# Patient Record
Sex: FEMALE | Race: ASIAN | URBAN - METROPOLITAN AREA
[De-identification: names, ages, dates, MRNs, and addresses within clinical notes are randomized per-mention and may not be internally consistent; named-entity substitution may affect disease eponyms.]

---

## 2023-02-11 ENCOUNTER — INPATIENT (INPATIENT)
Facility: HOSPITAL | Age: 88
LOS: 3 days | Discharge: NURSING, REHAB OR LONG-TERM CARE | End: 2023-02-15
Attending: INTERNAL MEDICINE | Admitting: INTERNAL MEDICINE
Payer: MEDICAID

## 2023-02-11 VITALS
DIASTOLIC BLOOD PRESSURE: 63 MMHG | SYSTOLIC BLOOD PRESSURE: 104 MMHG | RESPIRATION RATE: 18 BRPM | OXYGEN SATURATION: 100 % | HEART RATE: 92 BPM | TEMPERATURE: 98 F

## 2023-02-11 DIAGNOSIS — S72.92XA UNSPECIFIED FRACTURE OF LEFT FEMUR, INITIAL ENCOUNTER FOR CLOSED FRACTURE: Chronic | ICD-10-CM

## 2023-02-11 DIAGNOSIS — S92.009A UNSPECIFIED FRACTURE OF UNSPECIFIED CALCANEUS, INITIAL ENCOUNTER FOR CLOSED FRACTURE: ICD-10-CM

## 2023-02-11 LAB
ALBUMIN SERPL ELPH-MCNC: 3.8 G/DL — SIGNIFICANT CHANGE UP (ref 3.3–5)
ALP SERPL-CCNC: 79 U/L — SIGNIFICANT CHANGE UP (ref 40–120)
ALT FLD-CCNC: 12 U/L — SIGNIFICANT CHANGE UP (ref 4–33)
ANION GAP SERPL CALC-SCNC: 10 MMOL/L — SIGNIFICANT CHANGE UP (ref 7–14)
APPEARANCE UR: CLEAR — SIGNIFICANT CHANGE UP
AST SERPL-CCNC: 18 U/L — SIGNIFICANT CHANGE UP (ref 4–32)
BACTERIA # UR AUTO: ABNORMAL
BILIRUB SERPL-MCNC: 0.7 MG/DL — SIGNIFICANT CHANGE UP (ref 0.2–1.2)
BILIRUB UR-MCNC: NEGATIVE — SIGNIFICANT CHANGE UP
BUN SERPL-MCNC: 18 MG/DL — SIGNIFICANT CHANGE UP (ref 7–23)
CALCIUM SERPL-MCNC: 9.1 MG/DL — SIGNIFICANT CHANGE UP (ref 8.4–10.5)
CHLORIDE SERPL-SCNC: 105 MMOL/L — SIGNIFICANT CHANGE UP (ref 98–107)
CO2 SERPL-SCNC: 22 MMOL/L — SIGNIFICANT CHANGE UP (ref 22–31)
COLOR SPEC: YELLOW — SIGNIFICANT CHANGE UP
CREAT SERPL-MCNC: 0.81 MG/DL — SIGNIFICANT CHANGE UP (ref 0.5–1.3)
DIFF PNL FLD: NEGATIVE — SIGNIFICANT CHANGE UP
EGFR: 69 ML/MIN/1.73M2 — SIGNIFICANT CHANGE UP
EPI CELLS # UR: 11 /HPF — HIGH (ref 0–5)
GLUCOSE SERPL-MCNC: 116 MG/DL — HIGH (ref 70–99)
GLUCOSE UR QL: NEGATIVE — SIGNIFICANT CHANGE UP
HCT VFR BLD CALC: 37.7 % — SIGNIFICANT CHANGE UP (ref 34.5–45)
HGB BLD-MCNC: 11.4 G/DL — LOW (ref 11.5–15.5)
HYALINE CASTS # UR AUTO: 1 /LPF — SIGNIFICANT CHANGE UP (ref 0–7)
KETONES UR-MCNC: NEGATIVE — SIGNIFICANT CHANGE UP
LEUKOCYTE ESTERASE UR-ACNC: NEGATIVE — SIGNIFICANT CHANGE UP
MCHC RBC-ENTMCNC: 24.1 PG — LOW (ref 27–34)
MCHC RBC-ENTMCNC: 30.2 GM/DL — LOW (ref 32–36)
MCV RBC AUTO: 79.7 FL — LOW (ref 80–100)
NITRITE UR-MCNC: NEGATIVE — SIGNIFICANT CHANGE UP
NRBC # BLD: 0 /100 WBCS — SIGNIFICANT CHANGE UP (ref 0–0)
NRBC # FLD: 0 K/UL — SIGNIFICANT CHANGE UP (ref 0–0)
PH UR: 6 — SIGNIFICANT CHANGE UP (ref 5–8)
PLATELET # BLD AUTO: 190 K/UL — SIGNIFICANT CHANGE UP (ref 150–400)
POTASSIUM SERPL-MCNC: 4.3 MMOL/L — SIGNIFICANT CHANGE UP (ref 3.5–5.3)
POTASSIUM SERPL-SCNC: 4.3 MMOL/L — SIGNIFICANT CHANGE UP (ref 3.5–5.3)
PROT SERPL-MCNC: 6.8 G/DL — SIGNIFICANT CHANGE UP (ref 6–8.3)
PROT UR-MCNC: ABNORMAL
RBC # BLD: 4.73 M/UL — SIGNIFICANT CHANGE UP (ref 3.8–5.2)
RBC # FLD: 15.2 % — HIGH (ref 10.3–14.5)
RBC CASTS # UR COMP ASSIST: 2 /HPF — SIGNIFICANT CHANGE UP (ref 0–4)
SODIUM SERPL-SCNC: 137 MMOL/L — SIGNIFICANT CHANGE UP (ref 135–145)
SP GR SPEC: 1.02 — SIGNIFICANT CHANGE UP (ref 1.01–1.05)
UROBILINOGEN FLD QL: SIGNIFICANT CHANGE UP
WBC # BLD: 12.51 K/UL — HIGH (ref 3.8–10.5)
WBC # FLD AUTO: 12.51 K/UL — HIGH (ref 3.8–10.5)
WBC UR QL: 5 /HPF — SIGNIFICANT CHANGE UP (ref 0–5)

## 2023-02-11 PROCEDURE — 73620 X-RAY EXAM OF FOOT: CPT | Mod: 26,LT

## 2023-02-11 PROCEDURE — 73700 CT LOWER EXTREMITY W/O DYE: CPT | Mod: 26,LT,MA

## 2023-02-11 PROCEDURE — 99285 EMERGENCY DEPT VISIT HI MDM: CPT

## 2023-02-11 PROCEDURE — 74176 CT ABD & PELVIS W/O CONTRAST: CPT | Mod: 26,MG

## 2023-02-11 PROCEDURE — G1004: CPT

## 2023-02-11 RX ORDER — ACETAMINOPHEN 500 MG
650 TABLET ORAL ONCE
Refills: 0 | Status: COMPLETED | OUTPATIENT
Start: 2023-02-11 | End: 2023-02-11

## 2023-02-11 RX ADMIN — Medication 650 MILLIGRAM(S): at 17:07

## 2023-02-11 RX ADMIN — Medication 650 MILLIGRAM(S): at 18:07

## 2023-02-11 NOTE — ED ADULT NURSE NOTE - OBJECTIVE STATEMENT
Patient is an 88 yo female, denies PMH, presenting with L hip/ankle/foot pain after mechanical fall yesterday. AAOx2, at baseline per family member at bedside, no signs of distress, reports she was walking and fell down one step. Denies headstrike, LOC or anticoagulation. Patient was able to walk afterwards but coming in today with increasing pain. No ecchymosis or deformity on exam, L ankle swelling noted. Denies recent falls, injuries, illnesses. 20G PIV placed to RAC, labs sent per orders. Medicated for pain per orders. Pending x-rays. Fall precautions maintained.

## 2023-02-11 NOTE — CONSULT NOTE ADULT - ASSESSMENT
89F presents with a left calcaneal fracture.  - Pt seen and evaluated.  - Afebrile, WBC 12.51, vitals stable.  - Left Foot X-Ray: Nondisplaced posterior superior calcaneal fracture:  - Physical Exam:  - LLE CT: Pending.  - Recommend admit to medicine under Dr. Suazo 2/2 inability to ambulate with crutches and needs rehab placement.  - Applied posterior splint to be kept clean, dry, and intact.  - Recommend remaining non-weightbearing to the LLE.  - Pod Plan: Rehab placement with possible left ankle ORIF pending LLE CT.  - Please document medical clearance for podiatric surgery under anesthesia.  - Discussed with attending. 89F presents with a left calcaneal fracture.  - Pt seen and evaluated.  - Afebrile, WBC 12.51, vitals stable.  - Left Foot X-Ray: Nondisplaced posterior superior calcaneal fracture.  - Left foot mild ecchymosis on the area overlying the posterior calcaneus, no open wounds, no acute signs of infection. Right foot no open wounds, no acute signs of infection.  - LLE CT: Pending.  - Recommend admit to medicine under Dr. Suazo 2/2 inability to ambulate with crutches and needs rehab placement.  - Applied posterior splint to be kept clean, dry, and intact.  - Recommend remaining non-weightbearing to the LLE.  - Pod Plan: Rehab placement with possible left ankle ORIF pending LLE CT.  - Please document medical clearance for podiatric surgery under anesthesia.  - Discussed with attending. 89F presents with a left calcaneal fracture.  - Pt seen and evaluated.  - Afebrile, WBC 12.51, vitals stable.  - Left Foot X-Ray: Nondisplaced posterior superior calcaneal fracture.  - Left foot mild ecchymosis on the area overlying the posterior calcaneus, no open wounds, no acute signs of infection. Right foot no open wounds, no acute signs of infection.  - Neurovascular status intact with little to no concern for compartment syndrome.  - LLE CT: Pending.  - Recommend admit to medicine under Dr. Suazo 2/2 inability to ambulate with crutches and needs rehab placement.  - Applied posterior splint to be kept clean, dry, and intact.  - Recommend remaining non-weightbearing to the LLE.  - Pod Plan: Rehab placement with possible left ankle ORIF pending LLE CT.  - Please document medical clearance for podiatric surgery under anesthesia.  - Discussed with attending. 89F presents with a left calcaneal fracture.  - Pt seen and evaluated with grandson present for the entirety of the consult and translated.  - Afebrile, WBC 12.51, vitals stable.  - Left Foot X-Ray: Nondisplaced posterior superior calcaneal fracture.  - Left foot mild ecchymosis on the area overlying the posterior calcaneus, no open wounds, no acute signs of infection. Right foot no open wounds, no acute signs of infection.  - Neurovascular status intact with little to no concern for compartment syndrome.  - LLE CT: Pending.  - Recommend admit to medicine under Dr. Suazo 2/2 inability to ambulate with crutches and needs rehab placement.  - Applied posterior splint to be kept clean, dry, and intact.  - Recommend remaining non-weightbearing to the LLE.  - Pod Plan: Rehab placement with possible left ankle ORIF pending LLE CT.  - Please document medical clearance for podiatric surgery under anesthesia.  - Discussed with attending.

## 2023-02-11 NOTE — ED ADULT TRIAGE NOTE - CHIEF COMPLAINT QUOTE
lea states pt slipped while getting up from chair yesterday. c/o pain l ft, back- normally ambulates with canr. denies head injury,loc- on no meds grandson states pt slipped while getting up from chair yesterday. c/o pain l ft, back- normally ambulates with canr. denies head injury,loc- on no meds. denies dizziness prior to fall.  fs 132

## 2023-02-11 NOTE — ED PROVIDER NOTE - OBJECTIVE STATEMENT
Attending/Mateus: 89 yo F w/ (ambulates with cane, performs most ADLs) no sig PMHx brought in by her grandson s/p fall witnessed fall yesterday. While sitting on the last two stairs at the  bottoms of a staircase , pt stood up, s/lip/fall onto left side. No reports of head trauma, LOC. Pt vc/o left flank/hip/heel pain. Pt unabel tow eight-bear due to [pain. Deneis HODGE, nausea, CP, SOB, palp, weakness or lightheadedness.

## 2023-02-11 NOTE — ED PROVIDER NOTE - PHYSICAL EXAMINATION
Attending/Mateus: NAD; Head-atraumatic, PERRL/EOMI, no cpsine PT, supple, no JVD, RRR, CTAB; Abd-soft, NT/ND, no HSM; +left lower rib PT, no crepitus, nop STS, no ecchymosis; +left hip-+ROM, +pain, no ecchymosis, Lt heel-calcaneus +ecchymosis, +STS, no crepitus, A&Ox3, nonfocal; Skin-warm/dry

## 2023-02-11 NOTE — ED ADULT NURSE NOTE - CHIEF COMPLAINT QUOTE
grandson states pt slipped while getting up from chair yesterday. c/o pain l ft, back- normally ambulates with canr. denies head injury,loc- on no meds. denies dizziness prior to fall.  fs 132

## 2023-02-11 NOTE — ED PROVIDER NOTE - CLINICAL SUMMARY MEDICAL DECISION MAKING FREE TEXT BOX
A/P 88 yo F no sig PMHx, not on AC, s/p witnessed mechanical fall now c/o left flank/hip/calcaneal pain; exam-noted for left lower rib PT/hip pin/calcaneal pain r/o fracture  Plan CT/xray oif pelvis, hip/foot; labs, analgesia  No EMR to review

## 2023-02-11 NOTE — CONSULT NOTE ADULT - SUBJECTIVE AND OBJECTIVE BOX
Patient is a 89y old  Female who presents with a chief complaint of left foot pain.    HPI: 88 yo F w/ (ambulates with cane, performs most ADLs) no sig PMHx brought in by her grandson s/p fall witnessed fall yesterday. While sitting on the last two stairs at the  bottoms of a staircase , pt stood up, s/lip/fall onto left side. No reports of head trauma, LOC. Pt vc/o left flank/hip/heel pain. Pt unabel tow eight-bear due to [pain. Deneis HODGE, nausea, CP, SOB, palp, weakness or lightheadedness.      PAST MEDICAL & SURGICAL HISTORY:  No pertinent past medical history      Fracture of left femur          MEDICATIONS  (STANDING):    MEDICATIONS  (PRN):      Allergies    No Known Allergies    Intolerances        VITALS:    Vital Signs Last 24 Hrs  T(C): 36.9 (11 Feb 2023 20:53), Max: 36.9 (11 Feb 2023 14:40)  T(F): 98.5 (11 Feb 2023 20:53), Max: 98.5 (11 Feb 2023 14:40)  HR: 73 (11 Feb 2023 20:53) (73 - 92)  BP: 127/73 (11 Feb 2023 20:53) (104/63 - 127/73)  BP(mean): --  RR: 17 (11 Feb 2023 20:53) (17 - 18)  SpO2: 99% (11 Feb 2023 20:53) (99% - 100%)    Parameters below as of 11 Feb 2023 20:53  Patient On (Oxygen Delivery Method): room air        LABS:                          11.4   12.51 )-----------( 190      ( 11 Feb 2023 17:11 )             37.7       02-11    137  |  105  |  18  ----------------------------<  116<H>  4.3   |  22  |  0.81    Ca    9.1      11 Feb 2023 17:11    TPro  6.8  /  Alb  3.8  /  TBili  0.7  /  DBili  x   /  AST  18  /  ALT  12  /  AlkPhos  79  02-11      CAPILLARY BLOOD GLUCOSE      POCT Blood Glucose.: 132 mg/dL (11 Feb 2023 14:51)          LOWER EXTREMITY PHYSICAL EXAM:    Vascular: DP/PT _/4, B/L, CFT <_ seconds B/L, Temperature gradient _, B/L.   Neuro: Epicritic sensation _ to the level of _, B/L.  Musculoskeletal/Ortho:  Skin:      RADIOLOGY & ADDITIONAL STUDIES:     Patient is a 89y old  Female who presents with a chief complaint of left foot pain.    HPI: 89 yo F w/ (ambulates with cane, performs most ADLs) no sig PMHx brought in by her grandson s/p fall witnessed fall yesterday. While sitting on the last two stairs at the  bottoms of a staircase , pt stood up, s/lip/fall onto left side. No reports of head trauma, LOC. Pt vc/o left flank/hip/heel pain. Pt unabel tow eight-bear due to [pain. Deneis HODGE, nausea, CP, SOB, palp, weakness or lightheadedness.      PAST MEDICAL & SURGICAL HISTORY:  No pertinent past medical history      Fracture of left femur          MEDICATIONS  (STANDING):    MEDICATIONS  (PRN):      Allergies    No Known Allergies    Intolerances        VITALS:    Vital Signs Last 24 Hrs  T(C): 36.9 (11 Feb 2023 20:53), Max: 36.9 (11 Feb 2023 14:40)  T(F): 98.5 (11 Feb 2023 20:53), Max: 98.5 (11 Feb 2023 14:40)  HR: 73 (11 Feb 2023 20:53) (73 - 92)  BP: 127/73 (11 Feb 2023 20:53) (104/63 - 127/73)  BP(mean): --  RR: 17 (11 Feb 2023 20:53) (17 - 18)  SpO2: 99% (11 Feb 2023 20:53) (99% - 100%)    Parameters below as of 11 Feb 2023 20:53  Patient On (Oxygen Delivery Method): room air        LABS:                          11.4   12.51 )-----------( 190      ( 11 Feb 2023 17:11 )             37.7       02-11    137  |  105  |  18  ----------------------------<  116<H>  4.3   |  22  |  0.81    Ca    9.1      11 Feb 2023 17:11    TPro  6.8  /  Alb  3.8  /  TBili  0.7  /  DBili  x   /  AST  18  /  ALT  12  /  AlkPhos  79  02-11      CAPILLARY BLOOD GLUCOSE      POCT Blood Glucose.: 132 mg/dL (11 Feb 2023 14:51)          LOWER EXTREMITY PHYSICAL EXAM:    Vascular: DP/PT 2/4, B/L, CFT <3 seconds B/L, Temperature gradient warm to cool, B/L.   Neuro: Epicritic sensation intact to the level of toes, B/L.  Musculoskeletal/Ortho: Left foot pain on palpation of the insertion of the Achilles tendon with mild guarding.  Skin: Left foot mild ecchymosis on the area overlying the posterior calcaneus, no open wounds, no acute signs of infection. Right foot no open wounds, no acute signs of infection.      RADIOLOGY & ADDITIONAL STUDIES:

## 2023-02-11 NOTE — ED ADULT NURSE REASSESSMENT NOTE - NS ED NURSE REASSESS COMMENT FT1
Pt started by float RN. Pt A&Ox4, family at bedside translating, ambulatory with cane at baseline, coming to the ED s/p fall yesterday. Pt states to have pain in the right hip. Pt noted to have swollen R ankle. No other injuries noted at this time. Pt RR equal and unlabored. Pt reassessed for pain and states that pain is relieved after medication. Awaiting lab results and imaging. Safety maintained. Care plan continued as per ordered.

## 2023-02-12 ENCOUNTER — TRANSCRIPTION ENCOUNTER (OUTPATIENT)
Age: 88
End: 2023-02-12

## 2023-02-12 DIAGNOSIS — N94.9 UNSPECIFIED CONDITION ASSOCIATED WITH FEMALE GENITAL ORGANS AND MENSTRUAL CYCLE: ICD-10-CM

## 2023-02-12 DIAGNOSIS — D72.829 ELEVATED WHITE BLOOD CELL COUNT, UNSPECIFIED: ICD-10-CM

## 2023-02-12 DIAGNOSIS — S32.000A WEDGE COMPRESSION FRACTURE OF UNSPECIFIED LUMBAR VERTEBRA, INITIAL ENCOUNTER FOR CLOSED FRACTURE: ICD-10-CM

## 2023-02-12 DIAGNOSIS — R63.8 OTHER SYMPTOMS AND SIGNS CONCERNING FOOD AND FLUID INTAKE: ICD-10-CM

## 2023-02-12 DIAGNOSIS — S92.009A UNSPECIFIED FRACTURE OF UNSPECIFIED CALCANEUS, INITIAL ENCOUNTER FOR CLOSED FRACTURE: ICD-10-CM

## 2023-02-12 LAB
24R-OH-CALCIDIOL SERPL-MCNC: 11.8 NG/ML — LOW (ref 30–80)
ALBUMIN SERPL ELPH-MCNC: 3.5 G/DL — SIGNIFICANT CHANGE UP (ref 3.3–5)
ALP SERPL-CCNC: 71 U/L — SIGNIFICANT CHANGE UP (ref 40–120)
ALT FLD-CCNC: 10 U/L — SIGNIFICANT CHANGE UP (ref 4–33)
ANION GAP SERPL CALC-SCNC: 9 MMOL/L — SIGNIFICANT CHANGE UP (ref 7–14)
APTT BLD: 25.5 SEC — LOW (ref 27–36.3)
AST SERPL-CCNC: 17 U/L — SIGNIFICANT CHANGE UP (ref 4–32)
BASOPHILS # BLD AUTO: 0.02 K/UL — SIGNIFICANT CHANGE UP (ref 0–0.2)
BASOPHILS NFR BLD AUTO: 0.2 % — SIGNIFICANT CHANGE UP (ref 0–2)
BILIRUB SERPL-MCNC: 0.5 MG/DL — SIGNIFICANT CHANGE UP (ref 0.2–1.2)
BLD GP AB SCN SERPL QL: NEGATIVE — SIGNIFICANT CHANGE UP
BUN SERPL-MCNC: 18 MG/DL — SIGNIFICANT CHANGE UP (ref 7–23)
CALCIUM SERPL-MCNC: 8.6 MG/DL — SIGNIFICANT CHANGE UP (ref 8.4–10.5)
CHLORIDE SERPL-SCNC: 108 MMOL/L — HIGH (ref 98–107)
CO2 SERPL-SCNC: 23 MMOL/L — SIGNIFICANT CHANGE UP (ref 22–31)
CREAT SERPL-MCNC: 0.79 MG/DL — SIGNIFICANT CHANGE UP (ref 0.5–1.3)
EGFR: 71 ML/MIN/1.73M2 — SIGNIFICANT CHANGE UP
EOSINOPHIL # BLD AUTO: 0.14 K/UL — SIGNIFICANT CHANGE UP (ref 0–0.5)
EOSINOPHIL NFR BLD AUTO: 1.6 % — SIGNIFICANT CHANGE UP (ref 0–6)
GLUCOSE SERPL-MCNC: 143 MG/DL — HIGH (ref 70–99)
HCT VFR BLD CALC: 33 % — LOW (ref 34.5–45)
HGB BLD-MCNC: 10.4 G/DL — LOW (ref 11.5–15.5)
IANC: 5.92 K/UL — SIGNIFICANT CHANGE UP (ref 1.8–7.4)
IMM GRANULOCYTES NFR BLD AUTO: 0.4 % — SIGNIFICANT CHANGE UP (ref 0–0.9)
INR BLD: 1.22 RATIO — HIGH (ref 0.88–1.16)
LYMPHOCYTES # BLD AUTO: 2.05 K/UL — SIGNIFICANT CHANGE UP (ref 1–3.3)
LYMPHOCYTES # BLD AUTO: 24.1 % — SIGNIFICANT CHANGE UP (ref 13–44)
MAGNESIUM SERPL-MCNC: 2.2 MG/DL — SIGNIFICANT CHANGE UP (ref 1.6–2.6)
MCHC RBC-ENTMCNC: 25.1 PG — LOW (ref 27–34)
MCHC RBC-ENTMCNC: 31.5 GM/DL — LOW (ref 32–36)
MCV RBC AUTO: 79.5 FL — LOW (ref 80–100)
MONOCYTES # BLD AUTO: 0.36 K/UL — SIGNIFICANT CHANGE UP (ref 0–0.9)
MONOCYTES NFR BLD AUTO: 4.2 % — SIGNIFICANT CHANGE UP (ref 2–14)
NEUTROPHILS # BLD AUTO: 5.92 K/UL — SIGNIFICANT CHANGE UP (ref 1.8–7.4)
NEUTROPHILS NFR BLD AUTO: 69.5 % — SIGNIFICANT CHANGE UP (ref 43–77)
NRBC # BLD: 0 /100 WBCS — SIGNIFICANT CHANGE UP (ref 0–0)
NRBC # FLD: 0 K/UL — SIGNIFICANT CHANGE UP (ref 0–0)
PHOSPHATE SERPL-MCNC: 3.4 MG/DL — SIGNIFICANT CHANGE UP (ref 2.5–4.5)
PLATELET # BLD AUTO: 165 K/UL — SIGNIFICANT CHANGE UP (ref 150–400)
POTASSIUM SERPL-MCNC: 4.1 MMOL/L — SIGNIFICANT CHANGE UP (ref 3.5–5.3)
POTASSIUM SERPL-SCNC: 4.1 MMOL/L — SIGNIFICANT CHANGE UP (ref 3.5–5.3)
PROT SERPL-MCNC: 6.2 G/DL — SIGNIFICANT CHANGE UP (ref 6–8.3)
PROTHROM AB SERPL-ACNC: 14.2 SEC — HIGH (ref 10.5–13.4)
RBC # BLD: 4.15 M/UL — SIGNIFICANT CHANGE UP (ref 3.8–5.2)
RBC # FLD: 15.3 % — HIGH (ref 10.3–14.5)
RH IG SCN BLD-IMP: POSITIVE — SIGNIFICANT CHANGE UP
SARS-COV-2 RNA SPEC QL NAA+PROBE: SIGNIFICANT CHANGE UP
SODIUM SERPL-SCNC: 140 MMOL/L — SIGNIFICANT CHANGE UP (ref 135–145)
T4 AB SER-ACNC: 5.66 UG/DL — SIGNIFICANT CHANGE UP (ref 5.1–13)
TSH SERPL-MCNC: 5.43 UIU/ML — HIGH (ref 0.27–4.2)
WBC # BLD: 8.52 K/UL — SIGNIFICANT CHANGE UP (ref 3.8–10.5)
WBC # FLD AUTO: 8.52 K/UL — SIGNIFICANT CHANGE UP (ref 3.8–10.5)

## 2023-02-12 PROCEDURE — 72125 CT NECK SPINE W/O DYE: CPT | Mod: 26

## 2023-02-12 PROCEDURE — 99223 1ST HOSP IP/OBS HIGH 75: CPT

## 2023-02-12 PROCEDURE — 70450 CT HEAD/BRAIN W/O DYE: CPT | Mod: 26

## 2023-02-12 RX ORDER — SODIUM CHLORIDE 9 MG/ML
1000 INJECTION INTRAMUSCULAR; INTRAVENOUS; SUBCUTANEOUS
Refills: 0 | Status: DISCONTINUED | OUTPATIENT
Start: 2023-02-12 | End: 2023-02-13

## 2023-02-12 RX ORDER — ACETAMINOPHEN 500 MG
650 TABLET ORAL EVERY 6 HOURS
Refills: 0 | Status: DISCONTINUED | OUTPATIENT
Start: 2023-02-12 | End: 2023-02-15

## 2023-02-12 RX ORDER — OXYCODONE HYDROCHLORIDE 5 MG/1
5 TABLET ORAL EVERY 4 HOURS
Refills: 0 | Status: DISCONTINUED | OUTPATIENT
Start: 2023-02-12 | End: 2023-02-15

## 2023-02-12 RX ADMIN — Medication 650 MILLIGRAM(S): at 01:41

## 2023-02-12 RX ADMIN — OXYCODONE HYDROCHLORIDE 5 MILLIGRAM(S): 5 TABLET ORAL at 01:12

## 2023-02-12 RX ADMIN — SODIUM CHLORIDE 80 MILLILITER(S): 9 INJECTION INTRAMUSCULAR; INTRAVENOUS; SUBCUTANEOUS at 07:23

## 2023-02-12 RX ADMIN — OXYCODONE HYDROCHLORIDE 5 MILLIGRAM(S): 5 TABLET ORAL at 01:42

## 2023-02-12 RX ADMIN — Medication 650 MILLIGRAM(S): at 16:37

## 2023-02-12 RX ADMIN — SODIUM CHLORIDE 80 MILLILITER(S): 9 INJECTION INTRAMUSCULAR; INTRAVENOUS; SUBCUTANEOUS at 21:10

## 2023-02-12 RX ADMIN — Medication 650 MILLIGRAM(S): at 01:11

## 2023-02-12 NOTE — ED ADULT NURSE REASSESSMENT NOTE - NS ED NURSE REASSESS COMMENT FT1
report received from night shift RN. pt aaox4, breathing even and unlabored. Pt denies SOB, chest pain, dizziness and headache. Bed in lowest position, family at bedside translating.

## 2023-02-12 NOTE — DISCHARGE NOTE PROVIDER - NSDCCPCAREPLAN_GEN_ALL_CORE_FT
PRINCIPAL DISCHARGE DIAGNOSIS  Diagnosis: Calcaneal fracture  Assessment and Plan of Treatment: You were found to have a fracture of your heel. You were seen by the podiatry team. You are to continue non-weightbearing to the lower foot. Keep left lower extremity under 2 pillows. Continue with tylenol for pain.      SECONDARY DISCHARGE DIAGNOSES  Diagnosis: Adnexal cyst  Assessment and Plan of Treatment: You were found to have a cyst in your pelvis. You are to follow up with gynecology for repeat ultrasound as outpatient.    Diagnosis: Compression fracture of lumbar vertebra  Assessment and Plan of Treatment: You have a compression fracture of your vertebrae. You are to follow up with PCP as outpatient for further management.

## 2023-02-12 NOTE — ED ADULT NURSE REASSESSMENT NOTE - NS ED NURSE REASSESS COMMENT FT1
Pt denies complaints at this time. No acute distress noted. Respirations even and unlabored. No acute distress noted at this time. Elevated L legs with pillows, mouth care provided. Safety maintained. Continue with plan of care. Pt denies complaints at this time. Respirations even and unlabored. No acute distress noted at this time. Elevated L legs with pillows, mouth care provided. Safety maintained. Continue with plan of care.

## 2023-02-12 NOTE — H&P ADULT - PROBLEM SELECTOR PLAN 4
F-80 cc/hr  E-replete prn  N-NPO except meds  AC per podiatry -likely reactive in setting of mechanical fall, no dysuria, cough, or fevers  -no skin rash or other causes of leukocytosis

## 2023-02-12 NOTE — DISCHARGE NOTE PROVIDER - NSFOLLOWUPCLINICS_GEN_ALL_ED_FT
[facility]  Internal Medicine  [address] [place] [address]  Phone: [phone]  Fax: [phone]

## 2023-02-12 NOTE — DISCHARGE NOTE PROVIDER - REASON FOR ADMISSION
HOSPITALIST ADMISSION HISTORY AND PHYSICAL  Patient: Brian Behrens Date: 2020   : 1959    61 year old male 2020       PCP:   Dr. Akil Mcclendon ([facility])    This physician, recommends Brian Behrens be admitted as an INPATIENT to the medical unit/ICU. The expected LOS exceeds 2 midnights. Reason(s) for INPATIENT CARE include alcohol intoxication with impending withdrawal and based on severity of presenting sx, co-morbidities, and lab/imaging, this patient has an increased risk of adverse outcomes.      Based on severity of presenting sx, co-morbidities, and my review of lab/imaging results, I would expect Brian Behrens’s condition to deteriorate with possible end organ damage and/or life threatening complications if outpatient care persists.    CC:  Alcohol intoxication    HPI:  Brian Behrens is a 61 year old male with history of alcohol abuse, hypertension, and hyperlipidemia presenting with the desire to quit drinking.  He quit working at the began the COVID endemic () and since that time has been drinking on a daily basis from 11AM until he goes to sleep at night.  Both his best friend and dog have  within the last week.  He has not had seizures or hallucinations in the past, only tremors.  His food intake has been poor but he denies specific complaints.    PAST MEDICAL HISTORY  Past Medical History:   Diagnosis Date   • Elevated LFTs    • GERD (gastroesophageal reflux disease)    • HTN (hypertension)    • Hyperlipidemia    • Prediabetes 2019       PAST SURGICAL HISTORY  History reviewed. No pertinent surgical history.    SOCIAL HISTORY  Patient lives with his girlfriend.  Does not smoke cigarettes or use illicit drugs.  He is unable to quantify exactly how much he drinks but it is substantial.    FAMILY HISTORY  No family history on file.    MEDICATIONS  Current Outpatient Medications   Medication Sig Dispense Refill   • omeprazole (PRILOSEC) 40 MG capsule Take 1  capsule by mouth daily. 90 capsule 1   • lisinopril-hydroCHLOROthiazide (PRINZIDE,ZESTORETIC) 20-12.5 MG per tablet Take 2 tablets by mouth daily. 180 tablet 3   • atorvastatin (LIPITOR) 20 MG tablet Take 1 tablet by mouth daily. Please schedule appointment for future refills. 90 tablet 3   • methylPREDNISolone (MEDROL, AVRIL,) 4 MG tablet Take 6 tablets by mouth daily. Wean down until gone     • umeclidinium-vilanterol (ANORO ELLIPTA) 62.5-25 MCG/INH inhaler Inhale 1 puff into the lungs daily. 60 each 11   • B Complex-C (SUPER B COMPLEX PO) Take 1 tablet by mouth daily.     • aspirin 81 MG tablet Take 81 mg by mouth daily.     • Cholecalciferol (VITAMIN D-3) 1000 UNITS CAPS Take  by mouth.     • Multiple Vitamins-Minerals (MULTIVITAL PLATINUM SILVER) CHEW Chew 1 tablet by mouth daily. 90 tablet 3   • vitamin E 400 UNIT capsule Take 1 capsule by mouth daily. 90 capsule 3     ALLERGIES  ALLERGIES:  No Known Allergies    REVIEW OF SYMPTOMS   General: Denies fever, chills, weight loss  Skin: Denies new rashes, lesions  Eyes: Denies acute visual changes or discharge   HENT:: Denies cold, sore throat, epistaxis  Respiratory: Denies SOB, orthopnea, cough  Cardiovascular: Denies chest pain, palpitations, acute/worsening lower extremity edema  Gastrointestinal: Denies abdominal pain, nausea, vomiting, constipation, diarrhea  Genitourinary: Denies polyuria, dysuria, hematuria  Neurologic: Denies headache, speech disturbance, focal numbness or weakness  Musculoskeletal: Denies acute joint pains or inflammation    PHYSICAL EXAM:  VITAL SIGNS:   Visit Vitals  BP (!) 155/99   Pulse 76   Temp 98.5 °F (36.9 °C) (Temporal)   Resp (!) 21   Ht 6' 2\" (1.88 m)   Wt 104.3 kg   SpO2 94%   BMI 29.53 kg/m²   ;  Body mass index is 29.53 kg/m².  GENERAL APPEARANCE: No apparent distress. Alert and oriented x 3.  SKIN: No skin rashes, no ulcers.  HEENT: Normocephalic/atraumatic, PERRL, EOMI.  Anicteric sclera. Oropharynx moist without thrush,  ulcers or erythema.  NECK: Supple and non-tender. No jugular venous distension. No thyromegaly. No anterior, submandibular or supraclavicular adenopathy. No carotid bruits.   LUNGS: Unlabored breathing. Clear to auscultation. No rhonchi, wheezing, or crackles.  HEART: Normal PMI, normal rate and rhythm, no murmurs.  ABDOMEN: Soft, normoactive bowel sounds, non-tender without guarding or rebound. No hepatomegaly or splenomegaly.  EXTREMITIES: No clubbing or cyanosis. No edema. No calf asymmetry or tenderness.  Vascular exam reveals 2+ radial and dorsal pedis bilateral pulses.  MUSCULOSKELETAL and NEUROLOGIC: Grossly normal ROM of all 4 extremities.  Muscle tone and motor strength normal/appropriate for age. Cranial nerves 2 through 12 grossly normal.     LABS:  Recent Results (from the past 24 hour(s))   Comprehensive Metabolic Panel    Collection Time: 05/18/20  9:30 PM   Result Value Ref Range    Fasting Status      Sodium 142 135 - 145 mmol/L    Potassium 2.4 (LL) 3.4 - 5.1 mmol/L    Chloride 98 98 - 107 mmol/L    Carbon Dioxide 37 (H) 21 - 32 mmol/L    Anion Gap 9 (L) 10 - 20 mmol/L    Glucose 97 65 - 99 mg/dL    BUN 2 (L) 6 - 20 mg/dL    Creatinine 0.78 0.67 - 1.17 mg/dL    Glomerular Filtration Rate >90 >90    BUN/ Creatinine Ratio 3 (L) 7 - 25    Bilirubin, Total 1.1 (H) 0.2 - 1.0 mg/dL    GOT/ (H) <=37 Units/L    Alkaline Phosphatase 120 (H) 45 - 117 Units/L    Albumin 3.7 3.6 - 5.1 g/dL    Protein, Total 7.9 6.4 - 8.2 g/dL    Globulin 4.2 (H) 2.0 - 4.0 g/dL    A/G Ratio 0.9 (L) 1.0 - 2.4    GPT/ (H) <64 Units/L    Calcium 8.2 (L) 8.4 - 10.2 mg/dL   Salicylate Level    Collection Time: 05/18/20  9:30 PM   Result Value Ref Range    Salicylate <2.8 <=30.0 mg/dL   Acetaminophen Level    Collection Time: 05/18/20  9:30 PM   Result Value Ref Range    Acetaminophen <2 (L) 10 - 30 mcg/mL   Troponin I Ultra Sensitive    Collection Time: 05/18/20  9:30 PM   Result Value Ref Range    Troponin I, Ultra  Sensitive 0.02 <=0.04 ng/mL   CBC with Automated Differential (performable only)    Collection Time: 05/18/20  9:30 PM   Result Value Ref Range    WBC 3.5 (L) 4.2 - 11.0 K/mcL    RBC 4.61 4.50 - 5.90 mil/mcL    HGB 14.8 13.0 - 17.0 g/dL    HCT 41.1 39.0 - 51.0 %    MCV 89.2 78.0 - 100.0 fl    MCH 32.1 26.0 - 34.0 pg    MCHC 36.0 32.0 - 36.5 g/dL    RDW-CV 12.9 11.0 - 15.0 %     (L) 140 - 450 K/mcL    NRBC 0 <=0 /100 WBC    Neutrophil, Percent 27 %    Lymphocytes, Percent 53 %    Mono, Percent 14 %    Eosinophils, Percent 3 %    Basophils, Percent 3 %    Immature Granulocytes 0 %    Absolute Neutrophils 1.0 (L) 1.8 - 7.7 K/mcL    Absolute Lymphocytes 1.9 1.0 - 4.0 K/mcL    Absolute Monocytes 0.5 0.3 - 0.9 K/mcL    Absolute Eosinophils  0.1 0.1 - 0.5 K/mcL    Absolute Basophils 0.1 0.0 - 0.3 K/mcL    Absolute Immmature Granulocytes 0.0 0.0 - 0.2 K/mcL    RDW-SD 42.3 39.0 - 50.0 fL   Magnesium    Collection Time: 05/18/20  9:30 PM   Result Value Ref Range    Magnesium 1.6 (L) 1.7 - 2.4 mg/dL   Calcium, Ionized    Collection Time: 05/18/20  9:30 PM   Result Value Ref Range    Ionized Calcium 0.94 (L) 1.15 - 1.29 mmol/L    Ionized Calcium, Corrected 1.01 (L) 1.15 - 1.29 mmol/L   Alcohol    Collection Time: 05/18/20  9:30 PM   Result Value Ref Range    Alcohol 407 (H) <3 mg/dL   Rapid SARS-CoV-2 by PCR    Collection Time: 05/18/20 10:03 PM   Result Value Ref Range    Rapid SARS-COV-2 by PCR Not Detected Not Detected    Isolation Guidelines     URINALYSIS & REFLEX MICRO WITH CULTURE IF INDICATED    Collection Time: 05/18/20 11:43 PM   Result Value Ref Range    COLOR, URINALYSIS Yellow     APPEARANCE, URINALYSIS Clear     GLUCOSE, URINALYSIS Negative Negative mg/dL    BILIRUBIN, URINALYSIS Negative Negative    KETONES, URINALYSIS Negative Negative mg/dL    SPECIFIC GRAVITY, URINALYSIS <1.005 (L) 1.005 - 1.030    OCCULT BLOOD, URINALYSIS Trace (A) Negative    PH, URINALYSIS 7.0 5.0 - 7.0    PROTEIN, URINALYSIS  Negative Negative mg/dL    UROBILINOGEN, URINALYSIS 2.0 (A) 0.2, 1.0 mg/dL    NITRITE, URINALYSIS Negative Negative    LEUKOCYTE ESTERASE, URINALYSIS Negative Negative   Drug Abuse Screen, Urine    Collection Time: 05/18/20 11:43 PM   Result Value Ref Range    Amphetamines, Urine Negative Negative    Barbiturates, Urine Negative Negative    Benzodiazepines, Urine Negative Negative    Cocaine/ Metabolite, Urine Negative Negative    Opiates, Urine Negative Negative    Phencyclidine, Urine Negative Negative    Cannabinoids, Urine Negative Negative   URINALYSIS MICROSCOPIC    Collection Time: 05/18/20 11:43 PM   Result Value Ref Range    SQUAMOUS EPITHELIAL, URINALYSIS None Seen None Seen, 1 to 5 /hpf    ERYTHROCYTES, URINALYSIS 1 to 2 None Seen, 1 to 2 /hpf    LEUKOCYTES, URINALYSIS None Seen None Seen, 1 to 5 /hpf    BACTERIA, URINALYSIS None Seen None Seen /hpf    HYALINE CASTS, URINALYSIS None Seen None Seen, 1 to 5 /lpf    URIC ACID CRYSTALS Present         RADIOLOGY:    Xr Chest Ap Or Pa - Portable  Result Date: 5/18/2020  FINDINGS/IMPRESSION:  The cardiomediastinal silhouette is within normal limits. Pulmonary vessels are normally distributed. No focal pulmonary consolidation, pleural effusion or pneumothorax.     ASSESSMENT AND PLAN:  1. Alcohol abuse with impending withdrawal/alcoholic hepatitis - He will be admitted for monitoring on the CIWA pathway with lorazepam for significant signs and symptoms of withdrawal.  His liver show signs of early damage with mild transaminase elevations.  Right upper quadrant ultrasound to look at liver morphology along with viral hepatitis panel.    2. Hypokalemia/hypomagnesemia/hypocalcemia - These electrolytes have been supplemented in the emergency room and will be recheck to with further supplementation based on results.    3. Essential hypertension - Continue lisinopril but hold hydrochlorothiazide as contributing factor to electrolyte disturbances.  Hydralazine as  left calcaneal fracture needed.    4. Hyperlipidemia - Hold statin with LFT elevation.    Advanced Directives: Full Resuscitation    Plan of care discussed with patient.    Lex Velazco MD  Board Certified, Internal Medicine

## 2023-02-12 NOTE — DISCHARGE NOTE PROVIDER - PROVIDER RX CONTACT NUMBER
Patient alert and oriented x4. Admitted for CHF exacerbation. ECHO done today EF 35-40%. Plan for NPO at midnight. Will be diuresing with IV lasix BID. On xarelto for hx of afib. On RA, tolerating well. Ambulating independently.   Educated how to use and administer replacement therapy as ordered  Outcome: Progressing     Problem: SAFETY ADULT - FALL  Goal: Free from fall injury  Description: INTERVENTIONS:  - Assess pt frequently for physical needs  - Identify cognitive and physical deficits and behavi [phone]

## 2023-02-12 NOTE — PROGRESS NOTE ADULT - ASSESSMENT
89F presents with a left calcaneal fracture.  - Pt seen and evaluated with son and Nisha  ID# [id number]   - Afebrile, WBC 8.52, neurovascular status intact   - Left Foot X-Ray: Nondisplaced posterior superior calcaneal fracture.  - Left foot with posterior splint kept CDI on 2/12/23. Right foot no open wounds, no acute signs of infection.  - Neurovascular status intact with little to no concern for compartment syndrome.  - LLE CT:  Impacted fracture centered in the calcaneal body, without   intra-articular extension..  - Recommend continue non-weightbearing to the LLE.  - Please keep LLE elevated under 2 pillows  - Pod Plan: Rehab placement with possible left ankle ORIF pending attending review of LLE CT  - Please document medical clearance for podiatric surgery under anesthesia.  - Discussed with attending.     89F presents with a left calcaneal fracture.  - Pt seen and evaluated with son and Nisha  ID# [id number]   - Afebrile, WBC 8.52, neurovascular status intact   - Left Foot X-Ray: Nondisplaced posterior superior calcaneal fracture.  - Left foot with posterior splint kept CDI on 2/12/23. Right foot no open wounds, no acute signs of infection.  - Neurovascular status intact with little to no concern for compartment syndrome.  - LLE CT:  Impacted fracture centered in the calcaneal body, without   intra-articular extension..  - Recommend continue non-weightbearing to the LLE.  - Please keep LLE elevated under 2 pillows  - LLE CT reviewed : No surgical intervention planned from podiatry  - Pod Plan: Rehab placement pending PT consult  - Pt stable for discharge from a podiatry standpoint  - Follow up information can be found in discharge note provider under follow up   - Discussed with attending.     89F presents with a left calcaneal fracture.  - Pt seen and evaluated with son and Nisha  ID# [id number]   - Afebrile, WBC 8.52, neurovascular status intact   - Left Foot X-Ray: Nondisplaced posterior superior calcaneal fracture.  - Left foot with posterior splint kept CDI on 2/12/23. Right foot no open wounds, no acute signs of infection.  - Neurovascular status intact with little to no concern for compartment syndrome.  - LLE CT:  Impacted fracture centered in the calcaneal body, without   intra-articular extension..  - Recommend continue non-weightbearing to the LLE.  - Please keep LLE elevated under 2 pillows  - LLE CT reviewed : No surgical intervention planned from podiatry  - Pod Plan: Rehab placement or D/C home with home PT  pending PT consult  - Pt stable for discharge from a podiatry standpoint  - Follow up information can be found in discharge note provider under follow up   - Discussed with attending.

## 2023-02-12 NOTE — DISCHARGE NOTE PROVIDER - CARE PROVIDER_API CALL
Becca Patton (DPM)  Surgery  [address] [facility], [address]  [place] [address]  Phone: [phone]  Fax: [phone]  Follow Up Time:

## 2023-02-12 NOTE — H&P ADULT - ASSESSMENT
Patient is an 89 year old F ambulates with hx of right cataract, left hip fx w/ pin prior, chronic T12-L5 compression fracture who presents with mechanical fall yesterday.

## 2023-02-12 NOTE — ED ADULT NURSE REASSESSMENT NOTE - NS ED NURSE REASSESS COMMENT FT1
Patient at baseline mental status. Denies chest pain, headache and dizziness. Breathing even and unlabored. No pallor or diaphoresis noted at this time. Attempted to call floor and waited on phone for about 10 minutes. Report sheet sent up via tube system as per throughput RN instruction. Awaiting transport.

## 2023-02-12 NOTE — H&P ADULT - PROBLEM SELECTOR PLAN 2
-age indeterminate compression fracture.  -reached out to spine (orthopedic sx team overnight), no intervention needed, can have PT place patient on brace in am  -please reconsult spine in am for formal recs and f/u outpt as pt has limited insurance

## 2023-02-12 NOTE — ED ADULT NURSE REASSESSMENT NOTE - NS ED NURSE REASSESS COMMENT FT1
Pt utilized bed pan. Denying complaints at this time. Stating her pain is a 2 out of 10 at the moment. Denies chest pain, SOB, N/V/D. L foot wrapped by podiatry in splint and ACE bandage. Respirations even and unlabored. Morning labs sent. No acute distress at this time. R20GAC intact. Pt remains NPO.

## 2023-02-12 NOTE — H&P ADULT - PROBLEM SELECTOR PLAN 1
-left calcaneal fx w/ CT foot  -podiatry following wrapped  -possible surgery tomorrow vs conservative management mets >4 RCRI 0, moderate risk procedure EKG nonischemic. Patient medically optimized for procedure  -tylenol mild pain, oxycodone 5 mg IR moderate pain  -80 cc/hr NS  -can check TSH, and vitamin D levels in am pt likely has underlying osteoporosis needs outpt PCP f/u  -currently does not have PCP or insurance, is from [employer] working on [employer], also sent application for medicaid -left calcaneal fx w/ CT foot  -podiatry following wrapped  -possible surgery tomorrow vs conservative management mets >4 RCRI 0, moderate risk procedure EKG nonischemic. Patient medically optimized for procedure  -tylenol mild pain, oxycodone 5 mg IR moderate pain  -80 cc/hr NS  -can check TSH, and vitamin D levels in am pt likely has underlying osteoporosis needs outpt PCP f/u  -currently does not have PCP or insurance, is from [employer] working on [email or web address], also sent application for medicaid  -obtain head CT noncontrast and cervical r/o old infarcts, masses, or other causes of weakness

## 2023-02-12 NOTE — DISCHARGE NOTE PROVIDER - NSDCFUADDAPPT_GEN_ALL_CORE_FT
Podiatry Discharge Instructions:  Follow up: Please follow up with Dr. Patton within 1 week of discharge from the hospital, please call [phone] for appointment and discuss that you recently were seen in the hospital.  Wound Care: Please leave your dressing clean dry intact until your follow up appointment .  Weight bearing: Please remain non weight bearing to left lower extremity in posterior splint  Antibiotics: n/a Podiatry Discharge Instructions:  Follow up: Please follow up with Dr. Patton within 1 week of discharge from the hospital, please call [phone] for appointment and discuss that you recently were seen in the hospital.  Wound Care: Please leave your dressing clean dry intact until your follow up appointment .  Weight bearing: Please remain non weight bearing to left lower extremity in posterior splint  Antibiotics: n/a    Follow up with your primary care physician for further monitoring in 1-2 weeks. Please call to arrange appointment.

## 2023-02-12 NOTE — ED ADULT NURSE REASSESSMENT NOTE - NS ED NURSE REASSESS COMMENT FT1
Patient at baseline mental status. Denies chest pain, headache and dizziness. Breathing even and unlabored. No pallor or diaphoresis noted at this time. Patient left leg in cast, noted to have positive pulse motor and sensory of left leg. Denies any hip or leg pain at this time. Awaiting bed placement.

## 2023-02-12 NOTE — H&P ADULT - NSHPPHYSICALEXAM_GEN_ALL_CORE
PHYSICAL EXAM:  GENERAL: NAD, well-developed  HEAD:  Atraumatic, Normocephalic  EYES: EOMI, PERRLA, conjunctiva and sclera clear  NECK: Supple, No JVD  CHEST/LUNG: Clear to auscultation bilaterally; No wheeze  HEART: Regular rate and rhythm; No murmurs, rubs, or gallops  ABDOMEN: Soft, Nontender, Nondistended; Bowel sounds present  EXTREMITIES:  2+ Peripheral Pulses, No clubbing, cyanosis, or edema  PSYCH: AAOx3  NEUROLOGY: non-focal  SKIN: No rashes or lesions  MSK: Left foot wrapped

## 2023-02-12 NOTE — DISCHARGE NOTE PROVIDER - HOSPITAL COURSE
89 year old F ambulates with hx of right cataract, left hip fx w/ pin prior, chronic T12-L5 compression fracture who presents with mechanical fall yesterday.    Calcaneal fracture  - left calcaneal fx w/ CT foot  - Podiatry following wrapped  - possible surgery tomorrow vs conservative management mets >4 RCRI 0, moderate risk procedure EKG nonischemic. Patient medically optimized for procedure  - tylenol mild pain, oxycodone 5 mg IR moderate pain  - TSH, Vitamin D levels -11.8, needs outpt PCP f/u  - pt does not have PCP or insurance, is from [employer] working on [employer], also sent application for medicaid  - Cervical spine CT- No acute fractures or dislocations. Multilevel cervical spondylosis  - Head CT- No acute intracranial hemorrhage, mass effect, or shift of the midline structures. Mild chronic white matter microvascular type changes    L calcaneal fx   - LLE CT- Impacted fracture centered in the calcaneal body, without  intra-articular extension..No surgical intervention planned from podiatry  - Recommend continue non-weightbearing to the LLE.  - Please keep LLE elevated under 2 pillows  - Pt stable for discharge from a podiatry standpoint  - Follow up information can be found in discharge note provider under follow up     Compression fracture of lumbar vertebra  - age indeterminate compression fracture.  - reached out to spine (orthopedic sx team overnight), no intervention needed, can have   - PT place patient on brace in am  - please reconsult spine in am for formal recs and f/u outpt as pt has limited insurance.    Adnexal cyst  - 3.8 x 2.5 cm right adnexal cystic lesion  - needs outpatient f/u w/ pelvic US to double check    On_________, discussed with __________, patient is medically cleared and optimized for discharge today. All medications were reviewed with attending, and sent to mutually agreed upon pharmacy.   89 year old F ambulates with hx of right cataract, left hip fx w/ pin prior, chronic T12-L5 compression fracture who presents with mechanical fall yesterday.    Calcaneal fracture  - LLE CT- Impacted fracture centered in the calcaneal body, without  intra-articular extension..No surgical intervention planned from podiatry  - Podiatry following wrapped  - Recommend continue non-weightbearing to the LLE.  - Please keep LLE elevated under 2 pillows  - Pt stable for discharge from a podiatry standpoint  - tylenol mild pain, oxycodone 5 mg IR moderate pain  - pt does not have PCP or insurance, is from [employer] working on [employer], also sent application for medicaid    Fall   - Cervical spine CT- No acute fractures or dislocations. Multilevel cervical spondylosis  - Head CT- No acute intracranial hemorrhage, mass effect, or shift of the midline structures. Mild chronic white matter microvascular type changes     Compression fracture of lumbar vertebra  - age indeterminate compression fracture.  - reached out to spine (orthopedic sx team overnight), no intervention needed   - PT place patient on brace in am    Adnexal cyst  - 3.8 x 2.5 cm right adnexal cystic lesion  - needs outpatient f/u w/ pelvic US to double check    On_________, discussed with __________, patient is medically cleared and optimized for discharge today. All medications were reviewed with attending, and sent to mutually agreed upon pharmacy.   89 year old F ambulates with hx of right cataract, left hip fx w/ pin prior, chronic T12-L5 compression fracture who presents with mechanical fall yesterday.    Calcaneal fracture  - LLE CT- Impacted fracture centered in the calcaneal body, without  intra-articular extension..No surgical intervention planned from podiatry  - Podiatry following wrapped  - Recommend continue non-weightbearing to the LLE.  - Please keep LLE elevated under 2 pillows  - Pt stable for discharge from a podiatry standpoint  - tylenol mild pain, oxycodone 5 mg IR moderate pain  - pt does not have PCP or insurance, is from [employer] working on [employer], also sent application for medicaid    Fall   - Cervical spine CT- No acute fractures or dislocations. Multilevel cervical spondylosis  - Head CT- No acute intracranial hemorrhage, mass effect, or shift of the midline structures. Mild chronic white matter microvascular type changes     Compression fracture of lumbar vertebra  - age indeterminate compression fracture.  - reached out to spine (orthopedic sx team overnight), no intervention needed   - PT place patient on brace in am    Adnexal cyst  - 3.8 x 2.5 cm right adnexal cystic lesion  - needs outpatient f/u w/ pelvic US to double check    On 2/15/2023, discussed with Dr. Suazo, patient is medically cleared and optimized for discharge today. All medications were reviewed with attending, and sent to mutually agreed upon pharmacy.

## 2023-02-12 NOTE — DISCHARGE NOTE PROVIDER - NSDCMRMEDTOKEN_GEN_ALL_CORE_FT
acetaminophen 325 mg oral tablet: 2 tab(s) orally every 6 hours, As needed, Temp greater or equal to 38C (100.4F), Mild Pain (1 - 3)  bisacodyl 5 mg oral delayed release tablet: 1 tab(s) orally once a day (at bedtime)  cholecalciferol oral tablet: 1000 unit(s) orally once a day  polyethylene glycol 3350 oral powder for reconstitution: 17 gram(s) orally once a day  senna leaf extract oral tablet: 2 tab(s) orally once a day (at bedtime)

## 2023-02-12 NOTE — H&P ADULT - HISTORY OF PRESENT ILLNESS
Patient is an 89 year old F ambulates with hx of right cataract, left hip fx w/ pin prior, chronic T12-L5 compression fracture who presents with mechanical fall yesterday. She was sitting on the last two stairs and stood up, fell on left side. She landed on her buttocks area, denies any head trauma or LOC. She had a CT scan a/p in the ED that shows age indeterminate fx of T12-L5. CT scan left foot shows posterior calcaneal fracture. She denies history of osteoporosis, fevers, chills, dysuria, polyuria, melena, or hematochezia. Denies LE swelling. Mets >4, no reaction to anesthetics prior.     ED course: afebrile, /79, RR 17 99%, HR 85.   EKG no acute ST elevation, pathologic Q waves, Qtc and DC intervals wnl.

## 2023-02-12 NOTE — PATIENT PROFILE ADULT - FALL HARM RISK - HARM RISK INTERVENTIONS

## 2023-02-13 LAB
ANION GAP SERPL CALC-SCNC: 9 MMOL/L — SIGNIFICANT CHANGE UP (ref 7–14)
BUN SERPL-MCNC: 10 MG/DL — SIGNIFICANT CHANGE UP (ref 7–23)
CALCIUM SERPL-MCNC: 8.3 MG/DL — LOW (ref 8.4–10.5)
CHLORIDE SERPL-SCNC: 106 MMOL/L — SIGNIFICANT CHANGE UP (ref 98–107)
CO2 SERPL-SCNC: 23 MMOL/L — SIGNIFICANT CHANGE UP (ref 22–31)
CREAT SERPL-MCNC: 0.7 MG/DL — SIGNIFICANT CHANGE UP (ref 0.5–1.3)
EGFR: 83 ML/MIN/1.73M2 — SIGNIFICANT CHANGE UP
GLUCOSE SERPL-MCNC: 102 MG/DL — HIGH (ref 70–99)
HCT VFR BLD CALC: 32.1 % — LOW (ref 34.5–45)
HGB BLD-MCNC: 9.8 G/DL — LOW (ref 11.5–15.5)
MAGNESIUM SERPL-MCNC: 1.9 MG/DL — SIGNIFICANT CHANGE UP (ref 1.6–2.6)
MCHC RBC-ENTMCNC: 24.4 PG — LOW (ref 27–34)
MCHC RBC-ENTMCNC: 30.5 GM/DL — LOW (ref 32–36)
MCV RBC AUTO: 79.9 FL — LOW (ref 80–100)
NRBC # BLD: 0 /100 WBCS — SIGNIFICANT CHANGE UP (ref 0–0)
NRBC # FLD: 0 K/UL — SIGNIFICANT CHANGE UP (ref 0–0)
PHOSPHATE SERPL-MCNC: 2.4 MG/DL — LOW (ref 2.5–4.5)
PLATELET # BLD AUTO: 160 K/UL — SIGNIFICANT CHANGE UP (ref 150–400)
POTASSIUM SERPL-MCNC: 3.7 MMOL/L — SIGNIFICANT CHANGE UP (ref 3.5–5.3)
POTASSIUM SERPL-SCNC: 3.7 MMOL/L — SIGNIFICANT CHANGE UP (ref 3.5–5.3)
RBC # BLD: 4.02 M/UL — SIGNIFICANT CHANGE UP (ref 3.8–5.2)
RBC # FLD: 15.3 % — HIGH (ref 10.3–14.5)
SODIUM SERPL-SCNC: 138 MMOL/L — SIGNIFICANT CHANGE UP (ref 135–145)
VIT D25+D1,25 OH+D1,25 PNL SERPL-MCNC: 20.8 PG/ML — SIGNIFICANT CHANGE UP (ref 19.9–79.3)
WBC # BLD: 7.6 K/UL — SIGNIFICANT CHANGE UP (ref 3.8–10.5)
WBC # FLD AUTO: 7.6 K/UL — SIGNIFICANT CHANGE UP (ref 3.8–10.5)

## 2023-02-13 RX ORDER — CHOLECALCIFEROL (VITAMIN D3) 125 MCG
1000 CAPSULE ORAL DAILY
Refills: 0 | Status: DISCONTINUED | OUTPATIENT
Start: 2023-02-13 | End: 2023-02-15

## 2023-02-13 RX ORDER — POLYETHYLENE GLYCOL 3350 17 G/17G
17 POWDER, FOR SOLUTION ORAL DAILY
Refills: 0 | Status: DISCONTINUED | OUTPATIENT
Start: 2023-02-13 | End: 2023-02-15

## 2023-02-13 RX ORDER — SENNA PLUS 8.6 MG/1
2 TABLET ORAL AT BEDTIME
Refills: 0 | Status: DISCONTINUED | OUTPATIENT
Start: 2023-02-13 | End: 2023-02-15

## 2023-02-13 RX ADMIN — OXYCODONE HYDROCHLORIDE 5 MILLIGRAM(S): 5 TABLET ORAL at 12:52

## 2023-02-13 RX ADMIN — SENNA PLUS 2 TABLET(S): 8.6 TABLET ORAL at 22:08

## 2023-02-13 RX ADMIN — OXYCODONE HYDROCHLORIDE 5 MILLIGRAM(S): 5 TABLET ORAL at 13:45

## 2023-02-13 RX ADMIN — Medication 1000 UNIT(S): at 18:48

## 2023-02-13 RX ADMIN — POLYETHYLENE GLYCOL 3350 17 GRAM(S): 17 POWDER, FOR SOLUTION ORAL at 12:52

## 2023-02-13 NOTE — PHYSICAL THERAPY INITIAL EVALUATION ADULT - PERTINENT HX OF CURRENT PROBLEM, REHAB EVAL
patient is a 89 year old female admitted following a fall at home, found to have left calcaneal nondisplaced fracture, as per podiatry no surgical intervention at this time

## 2023-02-13 NOTE — PROGRESS NOTE ADULT - ASSESSMENT
89 year old F ambulates with hx of right cataract, left hip fx w/ pin prior, chronic T12-L5 compression fracture who presents with mechanical fall yesterday.    Problem/Plan - 1:  ·  Problem: Calcaneal fracture.   ·  Plan: -left calcaneal fx w/ CT foot  -podiatry fu       Problem/Plan - 2:  ·  Problem: Compression fracture of lumbar vertebra.   ·  Plan: -age indeterminate compression fracture.  sine consult pending     Problem/Plan - 3:  ·  Problem: Adnexal cyst.   ·  Plan: -3.8 x 2.5 cm right adnexal cystic lesion  -needs outpatient f/u w/ pelvic US     Problem/Plan - 4:  ·  Problem: Leukocytosis.  ·  Plan: -likely reactive in setting of mechanical fall, no dysuria, cough, or fevers  -no skin rash or other causes of leukocytosis.

## 2023-02-13 NOTE — PHYSICAL THERAPY INITIAL EVALUATION ADULT - GENERAL OBSERVATIONS, REHAB EVAL
Patient received in semifowler position in bed in NAD +LLE posterior splint donned and ace wrapped. Patient denies chest pain, SOB, headache, and dizziness.

## 2023-02-13 NOTE — PHYSICAL THERAPY INITIAL EVALUATION ADULT - ADDITIONAL COMMENTS
history obtained from granddaughter who was bedside. patient walks with cane and assist. patient lives with son and has to climb stairs to enter home

## 2023-02-13 NOTE — PHYSICAL THERAPY INITIAL EVALUATION ADULT - PATIENT PROFILE REVIEW, REHAB EVAL
Activity Orders: Bedrest Strict, however spoke with 5s EMERITA, Jennifer Clarke [phone], via phone call- obtained verbal order that bedrest can be discontinued for physical therapy/yes

## 2023-02-13 NOTE — PHYSICAL THERAPY INITIAL EVALUATION ADULT - RANGE OF MOTION EXAMINATION, REHAB EVAL
left ankle not tested due to posterior splint/bilateral upper extremity ROM was WFL (within functional limits)/bilateral lower extremity ROM was WFL (within functional limits)

## 2023-02-14 LAB
ANION GAP SERPL CALC-SCNC: 10 MMOL/L — SIGNIFICANT CHANGE UP (ref 7–14)
BUN SERPL-MCNC: 11 MG/DL — SIGNIFICANT CHANGE UP (ref 7–23)
CALCIUM SERPL-MCNC: 8.9 MG/DL — SIGNIFICANT CHANGE UP (ref 8.4–10.5)
CHLORIDE SERPL-SCNC: 104 MMOL/L — SIGNIFICANT CHANGE UP (ref 98–107)
CO2 SERPL-SCNC: 21 MMOL/L — LOW (ref 22–31)
CREAT SERPL-MCNC: 0.62 MG/DL — SIGNIFICANT CHANGE UP (ref 0.5–1.3)
EGFR: 85 ML/MIN/1.73M2 — SIGNIFICANT CHANGE UP
GLUCOSE SERPL-MCNC: 109 MG/DL — HIGH (ref 70–99)
HCT VFR BLD CALC: 33.5 % — LOW (ref 34.5–45)
HGB BLD-MCNC: 11.1 G/DL — LOW (ref 11.5–15.5)
MAGNESIUM SERPL-MCNC: 2 MG/DL — SIGNIFICANT CHANGE UP (ref 1.6–2.6)
MCHC RBC-ENTMCNC: 25.8 PG — LOW (ref 27–34)
MCHC RBC-ENTMCNC: 33.1 GM/DL — SIGNIFICANT CHANGE UP (ref 32–36)
MCV RBC AUTO: 77.9 FL — LOW (ref 80–100)
NRBC # BLD: 0 /100 WBCS — SIGNIFICANT CHANGE UP (ref 0–0)
NRBC # FLD: 0 K/UL — SIGNIFICANT CHANGE UP (ref 0–0)
PHOSPHATE SERPL-MCNC: 3.6 MG/DL — SIGNIFICANT CHANGE UP (ref 2.5–4.5)
PLATELET # BLD AUTO: 184 K/UL — SIGNIFICANT CHANGE UP (ref 150–400)
POTASSIUM SERPL-MCNC: 4.1 MMOL/L — SIGNIFICANT CHANGE UP (ref 3.5–5.3)
POTASSIUM SERPL-SCNC: 4.1 MMOL/L — SIGNIFICANT CHANGE UP (ref 3.5–5.3)
RBC # BLD: 4.3 M/UL — SIGNIFICANT CHANGE UP (ref 3.8–5.2)
RBC # FLD: 15.1 % — HIGH (ref 10.3–14.5)
SARS-COV-2 RNA SPEC QL NAA+PROBE: SIGNIFICANT CHANGE UP
SODIUM SERPL-SCNC: 135 MMOL/L — SIGNIFICANT CHANGE UP (ref 135–145)
WBC # BLD: 7.27 K/UL — SIGNIFICANT CHANGE UP (ref 3.8–10.5)
WBC # FLD AUTO: 7.27 K/UL — SIGNIFICANT CHANGE UP (ref 3.8–10.5)

## 2023-02-14 RX ADMIN — POLYETHYLENE GLYCOL 3350 17 GRAM(S): 17 POWDER, FOR SOLUTION ORAL at 13:11

## 2023-02-14 RX ADMIN — Medication 1000 UNIT(S): at 13:13

## 2023-02-14 RX ADMIN — Medication 650 MILLIGRAM(S): at 17:12

## 2023-02-14 RX ADMIN — Medication 650 MILLIGRAM(S): at 17:42

## 2023-02-14 NOTE — PROGRESS NOTE ADULT - ASSESSMENT
89F presents with a left calcaneal fracture    - Pt seen and evaluated   - Left Foot X-Ray: Nondisplaced posterior superior calcaneal fracture.  - LLE CT:  Impacted fracture centered in the calcaneal body, without   intra-articular extension.  - NV status to L foot intact, mild ecchymosis   - posterior splint reapplied, keep CDI  - non-weightbearing to the LLE  -  LLE elevated with 2 pillows  - pain meds prn  - dvt ppx   - LLE CT reviewed : No surgical intervention planned from podiatry  - Pod Plan: Rehab placement or D/C home with home PT  pending PT consult  - Pt stable for discharge from a podiatry standpoint  - Follow up information can be found in discharge note provider under follow up

## 2023-02-14 NOTE — PROGRESS NOTE ADULT - SUBJECTIVE AND OBJECTIVE BOX
Patient is a 89y old  Female who presents with a chief complaint of left calcaneal fracture (14 Feb 2023 15:51)       INTERVAL HPI/OVERNIGHT EVENTS:  Patient seen and evaluated at bedside.  Pt is resting comfortable in NAD.    Allergies    No Known Allergies    Intolerances        Vital Signs Last 24 Hrs  T(C): 36.9 (15 Feb 2023 05:16), Max: 36.9 (14 Feb 2023 13:16)  T(F): 98.4 (15 Feb 2023 05:16), Max: 98.5 (14 Feb 2023 13:16)  HR: 83 (15 Feb 2023 05:16) (83 - 95)  BP: 117/64 (15 Feb 2023 05:16) (117/64 - 129/78)  BP(mean): --  RR: 16 (15 Feb 2023 05:16) (16 - 18)  SpO2: 100% (15 Feb 2023 05:16) (96% - 100%)    Parameters below as of 15 Feb 2023 05:16  Patient On (Oxygen Delivery Method): room air        LABS:                        11.1   7.27  )-----------( 184      ( 14 Feb 2023 05:14 )             33.5     02-14    135  |  104  |  11  ----------------------------<  109<H>  4.1   |  21<L>  |  0.62    Ca    8.9      14 Feb 2023 05:14  Phos  3.6     02-14  Mg     2.00     02-14          CAPILLARY BLOOD GLUCOSE          Lower Extremity Physical Exam:  Vascular: DP/PT 2/4, B/L, CFT <3 seconds B/L, Temperature gradient warm to cool, B/L, mild edema to L posterior calcaneus   Neuro: Epicritic sensation intact to the level of toes, B/L.  Musculoskeletal/Ortho: Left foot pain on palpation of the insertion of the Achilles tendon with mild guarding.  Skin: Left foot mild ecchymosis on the area overlying the posterior calcaneus and plantar midfoot, no skin tenting, no open wounds, no acute signs of infection. Right foot no open wounds, no acute signs of infection.    
Patient is a 89y old  Female who presents with a chief complaint of left calcaneal fracture (13 Feb 2023 19:11)    Date of servie : 02-14-23 @ 15:51  INTERVAL HPI/OVERNIGHT EVENTS:  T(C): 36.9 (02-14-23 @ 13:16), Max: 36.9 (02-14-23 @ 13:16)  HR: 93 (02-14-23 @ 13:16) (87 - 93)  BP: 127/80 (02-14-23 @ 13:16) (124/68 - 132/78)  RR: 18 (02-14-23 @ 13:16) (16 - 18)  SpO2: 99% (02-14-23 @ 13:16) (96% - 99%)  Wt(kg): --  I&O's Summary      LABS:                        11.1   7.27  )-----------( 184      ( 14 Feb 2023 05:14 )             33.5     02-14    135  |  104  |  11  ----------------------------<  109<H>  4.1   |  21<L>  |  0.62    Ca    8.9      14 Feb 2023 05:14  Phos  3.6     02-14  Mg     2.00     02-14          CAPILLARY BLOOD GLUCOSE                MEDICATIONS  (STANDING):  bisacodyl 5 milliGRAM(s) Oral at bedtime  cholecalciferol 1000 Unit(s) Oral daily  polyethylene glycol 3350 17 Gram(s) Oral daily  senna 2 Tablet(s) Oral at bedtime    MEDICATIONS  (PRN):  acetaminophen     Tablet .. 650 milliGRAM(s) Oral every 6 hours PRN Temp greater or equal to 38C (100.4F), Mild Pain (1 - 3)  oxyCODONE    IR 5 milliGRAM(s) Oral every 4 hours PRN Moderate Pain (4 - 6)          PHYSICAL EXAM:  GENERAL: NAD, well-groomed, well-developed  HEAD:  Atraumatic, Normocephalic  CHEST/LUNG: Clear to percussion bilaterally; No rales, rhonchi, wheezing, or rubs  HEART: Regular rate and rhythm; No murmurs, rubs, or gallops  ABDOMEN: Soft, Nontender, Nondistended; Bowel sounds present  EXTREMITIES:  2+ Peripheral Pulses, No clubbing, cyanosis, or edema  LYMPH: No lymphadenopathy noted  SKIN: No rashes or lesions    Care Discussed with Consultants/Other Providers [ ] YES  [ ] NO
Patient is a 89y old  Female who presents with a chief complaint of left calcaneal fracture (12 Feb 2023 11:38)    Date of servie : 02-13-23 @ 19:11  INTERVAL HPI/OVERNIGHT EVENTS:  T(C): 36.9 (02-13-23 @ 14:55), Max: 37 (02-12-23 @ 21:00)  HR: 79 (02-13-23 @ 14:55) (79 - 92)  BP: 126/74 (02-13-23 @ 14:55) (105/62 - 149/73)  RR: 18 (02-13-23 @ 14:55) (18 - 18)  SpO2: 93% (02-13-23 @ 14:55) (93% - 99%)  Wt(kg): --  I&O's Summary      LABS:                        9.8    7.60  )-----------( 160      ( 13 Feb 2023 06:34 )             32.1     02-13    138  |  106  |  10  ----------------------------<  102<H>  3.7   |  23  |  0.70    Ca    8.3<L>      13 Feb 2023 06:34  Phos  2.4     02-13  Mg     1.90     02-13    TPro  6.2  /  Alb  3.5  /  TBili  0.5  /  DBili  x   /  AST  17  /  ALT  10  /  AlkPhos  71  02-12    PT/INR - ( 12 Feb 2023 04:30 )   PT: 14.2 sec;   INR: 1.22 ratio         PTT - ( 12 Feb 2023 04:30 )  PTT:25.5 sec    CAPILLARY BLOOD GLUCOSE                MEDICATIONS  (STANDING):  cholecalciferol 1000 Unit(s) Oral daily  polyethylene glycol 3350 17 Gram(s) Oral daily  senna 2 Tablet(s) Oral at bedtime    MEDICATIONS  (PRN):  acetaminophen     Tablet .. 650 milliGRAM(s) Oral every 6 hours PRN Temp greater or equal to 38C (100.4F), Mild Pain (1 - 3)  oxyCODONE    IR 5 milliGRAM(s) Oral every 4 hours PRN Moderate Pain (4 - 6)          PHYSICAL EXAM:  GENERAL: NAD, well-groomed, well-developed  HEAD:  Atraumatic, Normocephalic  CHEST/LUNG: Clear to percussion bilaterally; No rales, rhonchi, wheezing, or rubs  HEART: Regular rate and rhythm; No murmurs, rubs, or gallops  ABDOMEN: Soft, Nontender, Nondistended; Bowel sounds present  EXTREMITIES:  2+ Peripheral Pulses, No clubbing, cyanosis, or edema  LYMPH: No lymphadenopathy noted  SKIN: No rashes or lesions    Care Discussed with Consultants/Other Providers [ ] YES  [ ] NO
Podiatry pager #: [phone] ([place])/ [phone] ([facility])    Patient is a 89y old  Female who presents with a chief complaint of left calcaneal fracture (2023 01:26)       INTERVAL HPI/OVERNIGHT EVENTS:  Patient seen and evaluated at bedside.  Pt is resting comfortable in NAD. Denies N/V/F/C.     Allergies    No Known Allergies    Intolerances        Vital Signs Last 24 Hrs  T(C): 36.7 (2023 08:28), Max: 36.9 (2023 14:40)  T(F): 98 (2023 08:28), Max: 98.5 (2023 14:40)  HR: 81 (2023 08:28) (73 - 97)  BP: 133/72 (2023 08:28) (104/63 - 138/78)  BP(mean): --  RR: 17 (2023 08:28) (16 - 19)  SpO2: 100% (2023 08:28) (99% - 100%)    Parameters below as of 2023 08:28  Patient On (Oxygen Delivery Method): room air        LABS:                        10.4   8.52  )-----------( 165      ( 2023 04:30 )             33.0     02-12    140  |  108<H>  |  18  ----------------------------<  143<H>  4.1   |  23  |  0.79    Ca    8.6      2023 04:30  Phos  3.4     02-12  Mg     2.20     02-12    TPro  6.2  /  Alb  3.5  /  TBili  0.5  /  DBili  x   /  AST  17  /  ALT  10  /  AlkPhos  71  02-12    PT/INR - ( 2023 04:30 )   PT: 14.2 sec;   INR: 1.22 ratio         PTT - ( 2023 04:30 )  PTT:25.5 sec  Urinalysis Basic - ( 2023 17:16 )    Color: Yellow / Appearance: Clear / S.025 / pH: x  Gluc: x / Ketone: Negative  / Bili: Negative / Urobili: <2 mg/dL   Blood: x / Protein: 30 mg/dL / Nitrite: Negative   Leuk Esterase: Negative / RBC: 2 /HPF / WBC 5 /HPF   Sq Epi: x / Non Sq Epi: 11 /HPF / Bacteria: Few      CAPILLARY BLOOD GLUCOSE      POCT Blood Glucose.: 132 mg/dL (2023 14:51)      Lower Extremity Physical Exam:  Vascular: DP/PT 2/4, B/L, CFT <3 seconds B/L, Temperature gradient warm to cool, B/L.   Neuro: Epicritic sensation intact to the level of toes, B/L.  Musculoskeletal/Ortho: Left foot pain on palpation of the insertion of the Achilles tendon with mild guarding.  Skin: Left foot mild ecchymosis on the area overlying the posterior calcaneus, no open wounds, no acute signs of infection. Right foot no open wounds, no acute signs of infection.    RADIOLOGY & ADDITIONAL TESTS:    ACC: [id number] EXAM:  CT FOOT ONLY LT   ORDERED BY: MARIA TERESA MOMIN     PROCEDURE DATE:  2023          INTERPRETATION:  Indication:   Trauma, calcaneal fracture evaluation    Technique: Helical CT of the left foot obtained without contrast, and   multiplanar reformats submitted. Additional 3-D reformatted images   created on an outside workstation and submitted for review.    Comparison: Radiograph from the same day    Findings:  Impacted fracture centered in the calcaneal body, extends to  the superomedial calcaneal tuberosity. No intra-articular extension.   Subtalar and calcaneocuboid joints are normally aligned without   diastases. Tarsometatarsal alignment is normal.    Subcutaneous hematoma in the plantar surface of the hindfoot. No   radiopaque foreign matter or soft tissue emphysema. Peripheral vascular   disease.    Impression:  Impacted fracture centered in the calcaneal body, without   intra-articular extension..    --- End of Report ---            RAY JORGENSEN MD; Attending Radiologist  This document has been electronically signed. 2023 12:52AM

## 2023-02-14 NOTE — PROGRESS NOTE ADULT - ASSESSMENT
89 year old F ambulates with hx of right cataract, left hip fx w/ pin prior, chronic T12-L5 compression fracture who presents with mechanical fall yesterday.    Problem/Plan - 1:  ·  Problem: Calcaneal fracture.   ·  Plan: -left calcaneal fx w/ CT foot  -podiatry fu       Problem/Plan - 2:  ·  Problem: Compression fracture of lumbar vertebra.   ·  Plan: -age indeterminate compression fracture.  pain control   Problem/Plan - 3:  ·  Problem: Adnexal cyst.   ·  Plan: -3.8 x 2.5 cm right adnexal cystic lesion  -needs outpatient f/u w/ pelvic US     Problem/Plan - 4:  ·  Problem: Leukocytosis.  ·  Plan: -likely reactive in setting of mechanical fall, no dysuria, cough, or fevers  -no skin rash or other causes of leukocytosis.    dc planning to rehab  dw wife

## 2023-02-15 ENCOUNTER — TRANSCRIPTION ENCOUNTER (OUTPATIENT)
Age: 88
End: 2023-02-15

## 2023-02-15 VITALS
OXYGEN SATURATION: 97 % | DIASTOLIC BLOOD PRESSURE: 65 MMHG | SYSTOLIC BLOOD PRESSURE: 111 MMHG | HEART RATE: 87 BPM | RESPIRATION RATE: 18 BRPM | TEMPERATURE: 99 F

## 2023-02-15 RX ORDER — ACETAMINOPHEN 500 MG
2 TABLET ORAL
Qty: 0 | Refills: 0 | DISCHARGE
Start: 2023-02-15

## 2023-02-15 RX ORDER — SENNA PLUS 8.6 MG/1
2 TABLET ORAL
Qty: 0 | Refills: 0 | DISCHARGE
Start: 2023-02-15

## 2023-02-15 RX ORDER — POLYETHYLENE GLYCOL 3350 17 G/17G
17 POWDER, FOR SOLUTION ORAL
Qty: 0 | Refills: 0 | DISCHARGE
Start: 2023-02-15

## 2023-02-15 RX ORDER — CHOLECALCIFEROL (VITAMIN D3) 125 MCG
1000 CAPSULE ORAL
Qty: 0 | Refills: 0 | DISCHARGE
Start: 2023-02-15

## 2023-02-15 RX ADMIN — POLYETHYLENE GLYCOL 3350 17 GRAM(S): 17 POWDER, FOR SOLUTION ORAL at 12:07

## 2023-02-15 RX ADMIN — Medication 1000 UNIT(S): at 12:07

## 2023-02-15 NOTE — DISCHARGE NOTE NURSING/CASE MANAGEMENT/SOCIAL WORK - NSDCCRNAME_GEN_ALL_CORE_FT
[facility] [phone] [facility]   Transportation [facility] [phone] trip #[phone]

## 2023-02-15 NOTE — DISCHARGE NOTE NURSING/CASE MANAGEMENT/SOCIAL WORK - PATIENT PORTAL LINK FT
You can access the FollowMyHealth Patient Portal offered by [facility] by registering at the following website: [email or web address]. By joining [employer]’s FollowMyHealth portal, you will also be able to view your health information using other applications (apps) compatible with our system.

## 2023-03-26 PROBLEM — H26.9 UNSPECIFIED CATARACT: Chronic | Status: ACTIVE | Noted: 2023-02-12

## 2023-03-28 PROBLEM — Z00.00 ENCOUNTER FOR PREVENTIVE HEALTH EXAMINATION: Status: ACTIVE | Noted: 2023-03-28

## 2023-04-05 ENCOUNTER — APPOINTMENT (OUTPATIENT)
Dept: ORTHOPEDIC SURGERY | Facility: HOSPITAL | Age: 88
End: 2023-04-05

## 2023-04-05 ENCOUNTER — APPOINTMENT (OUTPATIENT)
Dept: RADIOLOGY | Facility: HOSPITAL | Age: 88
End: 2023-04-05

## 2023-04-05 ENCOUNTER — OUTPATIENT (OUTPATIENT)
Dept: OUTPATIENT SERVICES | Facility: HOSPITAL | Age: 88
LOS: 1 days | End: 2023-04-05
Payer: COMMERCIAL

## 2023-04-05 VITALS
TEMPERATURE: 98.1 F | BODY MASS INDEX: 17.61 KG/M2 | WEIGHT: 130 LBS | SYSTOLIC BLOOD PRESSURE: 133 MMHG | HEART RATE: 84 BPM | DIASTOLIC BLOOD PRESSURE: 79 MMHG | HEIGHT: 72 IN

## 2023-04-05 DIAGNOSIS — S72.92XA UNSPECIFIED FRACTURE OF LEFT FEMUR, INITIAL ENCOUNTER FOR CLOSED FRACTURE: Chronic | ICD-10-CM

## 2023-04-05 PROCEDURE — 73630 X-RAY EXAM OF FOOT: CPT | Mod: 26,LT

## 2023-04-06 DIAGNOSIS — S92.002A UNSPECIFIED FRACTURE OF LEFT CALCANEUS, INITIAL ENCOUNTER FOR CLOSED FRACTURE: ICD-10-CM

## 2023-05-10 ENCOUNTER — APPOINTMENT (OUTPATIENT)
Dept: ORTHOPEDIC SURGERY | Facility: HOSPITAL | Age: 88
End: 2023-05-10

## 2023-05-10 ENCOUNTER — APPOINTMENT (OUTPATIENT)
Dept: RADIOLOGY | Facility: HOSPITAL | Age: 88
End: 2023-05-10

## 2023-05-10 ENCOUNTER — RESULT REVIEW (OUTPATIENT)
Age: 88
End: 2023-05-10

## 2023-05-10 ENCOUNTER — OUTPATIENT (OUTPATIENT)
Dept: OUTPATIENT SERVICES | Facility: HOSPITAL | Age: 88
LOS: 1 days | End: 2023-05-10
Payer: COMMERCIAL

## 2023-05-10 VITALS
TEMPERATURE: 98.1 F | SYSTOLIC BLOOD PRESSURE: 144 MMHG | DIASTOLIC BLOOD PRESSURE: 76 MMHG | WEIGHT: 130 LBS | HEIGHT: 72 IN | BODY MASS INDEX: 17.61 KG/M2 | HEART RATE: 89 BPM

## 2023-05-10 DIAGNOSIS — S92.002D UNSPECIFIED FRACTURE OF LEFT CALCANEUS, SUBSEQUENT ENCOUNTER FOR FRACTURE WITH ROUTINE HEALING: ICD-10-CM

## 2023-05-10 PROCEDURE — 73650 X-RAY EXAM OF HEEL: CPT | Mod: 26,LT

## 2023-05-11 DIAGNOSIS — S92.002D UNSPECIFIED FRACTURE OF LEFT CALCANEUS, SUBSEQUENT ENCOUNTER FOR FRACTURE WITH ROUTINE HEALING: ICD-10-CM

## 2024-09-29 ENCOUNTER — EMERGENCY (EMERGENCY)
Facility: HOSPITAL | Age: 89
LOS: 1 days | Discharge: HOME | End: 2024-09-29
Attending: EMERGENCY MEDICINE | Admitting: EMERGENCY MEDICINE
Payer: MEDICAID

## 2024-09-29 VITALS
RESPIRATION RATE: 18 BRPM | OXYGEN SATURATION: 100 % | DIASTOLIC BLOOD PRESSURE: 69 MMHG | SYSTOLIC BLOOD PRESSURE: 150 MMHG | TEMPERATURE: 98 F | HEART RATE: 79 BPM

## 2024-09-29 VITALS
OXYGEN SATURATION: 100 % | DIASTOLIC BLOOD PRESSURE: 78 MMHG | SYSTOLIC BLOOD PRESSURE: 138 MMHG | HEART RATE: 74 BPM | TEMPERATURE: 98 F | RESPIRATION RATE: 17 BRPM

## 2024-09-29 DIAGNOSIS — S72.92XA UNSPECIFIED FRACTURE OF LEFT FEMUR, INITIAL ENCOUNTER FOR CLOSED FRACTURE: Chronic | ICD-10-CM

## 2024-09-29 LAB
ALBUMIN SERPL ELPH-MCNC: 4 G/DL — SIGNIFICANT CHANGE UP (ref 3.3–5)
ALP SERPL-CCNC: 78 U/L — SIGNIFICANT CHANGE UP (ref 40–120)
ALT FLD-CCNC: 7 U/L — SIGNIFICANT CHANGE UP (ref 4–33)
ANION GAP SERPL CALC-SCNC: 12 MMOL/L — SIGNIFICANT CHANGE UP (ref 7–14)
AST SERPL-CCNC: 19 U/L — SIGNIFICANT CHANGE UP (ref 4–32)
BASOPHILS # BLD AUTO: 0.01 K/UL — SIGNIFICANT CHANGE UP (ref 0–0.2)
BASOPHILS NFR BLD AUTO: 0.1 % — SIGNIFICANT CHANGE UP (ref 0–2)
BILIRUB SERPL-MCNC: 0.4 MG/DL — SIGNIFICANT CHANGE UP (ref 0.2–1.2)
BUN SERPL-MCNC: 10 MG/DL — SIGNIFICANT CHANGE UP (ref 7–23)
CALCIUM SERPL-MCNC: 9.4 MG/DL — SIGNIFICANT CHANGE UP (ref 8.4–10.5)
CHLORIDE SERPL-SCNC: 105 MMOL/L — SIGNIFICANT CHANGE UP (ref 98–107)
CO2 SERPL-SCNC: 22 MMOL/L — SIGNIFICANT CHANGE UP (ref 22–31)
CREAT SERPL-MCNC: 0.84 MG/DL — SIGNIFICANT CHANGE UP (ref 0.5–1.3)
CRP SERPL-MCNC: 4.4 MG/L — SIGNIFICANT CHANGE UP
EGFR: 66 ML/MIN/1.73M2 — SIGNIFICANT CHANGE UP
EOSINOPHIL # BLD AUTO: 0.01 K/UL — SIGNIFICANT CHANGE UP (ref 0–0.5)
EOSINOPHIL NFR BLD AUTO: 0.1 % — SIGNIFICANT CHANGE UP (ref 0–6)
ERYTHROCYTE [SEDIMENTATION RATE] IN BLOOD: 13 MM/HR — SIGNIFICANT CHANGE UP (ref 4–25)
GLUCOSE SERPL-MCNC: 98 MG/DL — SIGNIFICANT CHANGE UP (ref 70–99)
HCT VFR BLD CALC: 41 % — SIGNIFICANT CHANGE UP (ref 34.5–45)
HGB BLD-MCNC: 12.6 G/DL — SIGNIFICANT CHANGE UP (ref 11.5–15.5)
IANC: 4.4 K/UL — SIGNIFICANT CHANGE UP (ref 1.8–7.4)
IMM GRANULOCYTES NFR BLD AUTO: 0.4 % — SIGNIFICANT CHANGE UP (ref 0–0.9)
LYMPHOCYTES # BLD AUTO: 3.01 K/UL — SIGNIFICANT CHANGE UP (ref 1–3.3)
LYMPHOCYTES # BLD AUTO: 37.6 % — SIGNIFICANT CHANGE UP (ref 13–44)
MCHC RBC-ENTMCNC: 24.6 PG — LOW (ref 27–34)
MCHC RBC-ENTMCNC: 30.7 GM/DL — LOW (ref 32–36)
MCV RBC AUTO: 79.9 FL — LOW (ref 80–100)
MONOCYTES # BLD AUTO: 0.54 K/UL — SIGNIFICANT CHANGE UP (ref 0–0.9)
MONOCYTES NFR BLD AUTO: 6.8 % — SIGNIFICANT CHANGE UP (ref 2–14)
NEUTROPHILS # BLD AUTO: 4.4 K/UL — SIGNIFICANT CHANGE UP (ref 1.8–7.4)
NEUTROPHILS NFR BLD AUTO: 55 % — SIGNIFICANT CHANGE UP (ref 43–77)
NRBC # BLD: 0 /100 WBCS — SIGNIFICANT CHANGE UP (ref 0–0)
NRBC # FLD: 0 K/UL — SIGNIFICANT CHANGE UP (ref 0–0)
PLATELET # BLD AUTO: 263 K/UL — SIGNIFICANT CHANGE UP (ref 150–400)
POTASSIUM SERPL-MCNC: 4.1 MMOL/L — SIGNIFICANT CHANGE UP (ref 3.5–5.3)
POTASSIUM SERPL-SCNC: 4.1 MMOL/L — SIGNIFICANT CHANGE UP (ref 3.5–5.3)
PROT SERPL-MCNC: 7 G/DL — SIGNIFICANT CHANGE UP (ref 6–8.3)
RBC # BLD: 5.13 M/UL — SIGNIFICANT CHANGE UP (ref 3.8–5.2)
RBC # FLD: 15.5 % — HIGH (ref 10.3–14.5)
SODIUM SERPL-SCNC: 139 MMOL/L — SIGNIFICANT CHANGE UP (ref 135–145)
WBC # BLD: 8 K/UL — SIGNIFICANT CHANGE UP (ref 3.8–10.5)
WBC # FLD AUTO: 8 K/UL — SIGNIFICANT CHANGE UP (ref 3.8–10.5)

## 2024-09-29 PROCEDURE — 74177 CT ABD & PELVIS W/CONTRAST: CPT | Mod: 26,MC

## 2024-09-29 PROCEDURE — 71260 CT THORAX DX C+: CPT | Mod: 26,MC

## 2024-09-29 PROCEDURE — 99285 EMERGENCY DEPT VISIT HI MDM: CPT

## 2024-09-29 PROCEDURE — 70450 CT HEAD/BRAIN W/O DYE: CPT | Mod: 26,MC

## 2024-09-29 RX ORDER — ACETAMINOPHEN 325 MG/1
1000 TABLET ORAL ONCE
Refills: 0 | Status: COMPLETED | OUTPATIENT
Start: 2024-09-29 | End: 2024-09-29

## 2024-09-29 RX ORDER — ONDANSETRON 2 MG/ML
4 INJECTION, SOLUTION INTRAMUSCULAR; INTRAVENOUS ONCE
Refills: 0 | Status: DISCONTINUED | OUTPATIENT
Start: 2024-09-29 | End: 2024-09-29

## 2024-09-29 RX ORDER — SODIUM CHLORIDE 9 MG/ML
500 INJECTION INTRAMUSCULAR; INTRAVENOUS; SUBCUTANEOUS ONCE
Refills: 0 | Status: COMPLETED | OUTPATIENT
Start: 2024-09-29 | End: 2024-09-29

## 2024-09-29 RX ORDER — KETOROLAC TROMETHAMINE 30 MG/ML
15 INJECTION, SOLUTION INTRAMUSCULAR ONCE
Refills: 0 | Status: DISCONTINUED | OUTPATIENT
Start: 2024-09-29 | End: 2024-09-29

## 2024-09-29 RX ORDER — METOCLOPRAMIDE HCL 5 MG
10 TABLET ORAL ONCE
Refills: 0 | Status: COMPLETED | OUTPATIENT
Start: 2024-09-29 | End: 2024-09-29

## 2024-09-29 RX ADMIN — ACETAMINOPHEN 400 MILLIGRAM(S): 325 TABLET ORAL at 12:20

## 2024-09-29 RX ADMIN — Medication 10 MILLIGRAM(S): at 12:21

## 2024-09-29 RX ADMIN — KETOROLAC TROMETHAMINE 15 MILLIGRAM(S): 30 INJECTION, SOLUTION INTRAMUSCULAR at 15:00

## 2024-09-29 RX ADMIN — SODIUM CHLORIDE 500 MILLILITER(S): 9 INJECTION INTRAMUSCULAR; INTRAVENOUS; SUBCUTANEOUS at 12:20

## 2024-09-29 RX ADMIN — ACETAMINOPHEN 1000 MILLIGRAM(S): 325 TABLET ORAL at 14:00

## 2024-09-29 RX ADMIN — KETOROLAC TROMETHAMINE 15 MILLIGRAM(S): 30 INJECTION, SOLUTION INTRAMUSCULAR at 14:08

## 2024-09-29 NOTE — ED PROVIDER NOTE - ATTENDING CONTRIBUTION TO CARE
I performed a face-to-face evaluation of the patient and performed a history and physical examination. I agree with the history and physical examination. If this was a PA visit, I personally saw the patient with the PA and performed a substantive portion of the visit including all aspects of the medical decision making.    HA. Found to have dark lesion on R face and bumpy/bony lesion on scalp. Concern for cancer. CT, ESR/CRP. Reglan and Tylenol.

## 2024-09-29 NOTE — ED PROVIDER NOTE - PROGRESS NOTE DETAILS
Aditi Saravia DO (PGY-1): Aditi Saravia DO (PGY-1): CT scan reveals dermoid cyst unchanged from previous image, CT chest and abdomen negative for mass. Vitals stable with discharge with derm follow up.

## 2024-09-29 NOTE — ED ADULT NURSE NOTE - OBJECTIVE STATEMENT
Pt. is alert and oriented x 2, presenting to the ER with complaints of having a headache and watery eyes. Pt. speaks [place] and is accompanied by her son Lionel who provided translation. Son stated "my mother has been complaining of having a headache for a week and her eyes keep running water". No c/o feeling dizzy or change in vision. Pt. is out of bed with one assisting to wheelchair to the bathroom. Medicated as ordered, labs sent.

## 2024-09-29 NOTE — ED ADULT NURSE NOTE - CODE STROKE ACTIVE YN
Pt A&OX3, breathing regular and unlabored. Ambulatory. Medicated for pain with good result. No signs of distress noted. No

## 2024-09-29 NOTE — ED PROVIDER NOTE - NSFOLLOWUPCLINICS_GEN_ALL_ED_FT
[facility]  [address], [address]  [place] [address]  Phone: [phone]  Fax: [phone]

## 2024-09-29 NOTE — ED ADULT NURSE NOTE - NSFALLHARMRISKINTERV_ED_ALL_ED
Assistance OOB with selected safe patient handling equipment if applicable/Assistance with ambulation/Communicate risk of Fall with Harm to all staff, patient, and family/Monitor gait and stability/Monitor for mental status changes and reorient to person, place, and time, as needed/Move patient closer to nursing station/within visual sight of ED staff/Provide patient with walking aids/Provide visual cue: red socks, yellow wristband, yellow gown, etc/Reinforce activity limits and safety measures with patient and family/Toileting schedule using arm’s reach rule for commode and bathroom/Use of alarms - bed, stretcher, chair and/or video monitoring/Bed in lowest position, wheels locked, appropriate side rails in place/Call bell, personal items and telephone in reach/Instruct patient to call for assistance before getting out of bed/chair/stretcher/Non-slip footwear applied when patient is off stretcher/[place] to call system/Physically safe environment - no spills, clutter or unnecessary equipment/Purposeful Proactive Rounding/Room/bathroom lighting operational, light cord in reach

## 2024-09-29 NOTE — ED PROVIDER NOTE - PHYSICAL EXAMINATION
GENERAL: Awake, alert, NAD  HEENT: R eye hazy cornea, closed at rest, L pupil reactive to light, EOMI  CARDIAC: RRR, no m/r/g  ABDOMEN: tenderness with guarding in the bilateral lower quadrants   NEURO: A&Ox3. Moving all extremities. CN II-XII intact, moving all extremities   SKIN: brown, asymmetric, mottled lesion on her right temporal area.    PSYCH: Normal affect.

## 2024-09-29 NOTE — ED PROVIDER NOTE - OBJECTIVE STATEMENT
89-year-old female past medical history of right eye blindness presenting with right-sided headache, increased lacrimation of right eye and abdominal pain.  Patient has had worsening right-sided headache for 3 weeks now.  She endorses dizziness when standing.  Last bowel movement was this morning but endorses small caliber.  Patient has leision on the R temporal area that has been progressively increasing in size. Denies dysuria, hematuria, vomiting, fever, chest pain, shortness of breath.

## 2024-09-29 NOTE — ED PROVIDER NOTE - PATIENT PORTAL LINK FT
You can access the FollowMyHealth Patient Portal offered by [facility] by registering at the following website: [email or web address]. By joining [employer]’s FollowMyHealth portal, you will also be able to view your health information using other applications (apps) compatible with our system.

## 2024-09-29 NOTE — ED PROVIDER NOTE - NSFOLLOWUPINSTRUCTIONS_ED_ALL_ED_FT
Please follow-up with dermatologist for evaluation of facial lesion.   All blood work and CT scans are negative. Somebody from [facility] should be calling you in the next 2 to 3 days to schedule an appointment, if they do not call please call the number below to schedule an appointment as soon as possible. Please follow-up with dermatologist for evaluation of facial lesion.   All blood work and CT scans are negative. Somebody from [facility] should be calling you in the next 2 to 3 days to schedule an appointment, if they do not call please call the number below to schedule an appointment as soon as possible.    Headache    A headache is pain or discomfort felt around the head or neck area. The specific cause of a headache may not be found as there are many types including tension headaches, migraine headaches, and cluster headaches. Watch your condition for any changes. Things you can do to manage your pain include taking over the counter and prescription medications as instructed by your health care provider, lying down in a dark quiet room, limiting stress, getting regular sleep, and refraining from alcohol and tobacco products.    SEEK IMMEDIATE MEDICAL CARE IF YOU HAVE ANY OF THE FOLLOWING SYMPTOMS: fever, vomiting, stiff neck, loss of vision, problems with speech, muscle weakness, loss of balance, trouble walking, passing out, or confusion.

## 2024-09-29 NOTE — ED PROVIDER NOTE - NS ED MD DISPO DISCHARGE CCDA
PAST SURGICAL HISTORY:  Biceps tendon tear right    History of bunionectomy of left great toe     History of bunionectomy of right great toe      Patient/Caregiver provided printed discharge information.

## 2024-09-29 NOTE — ED PROVIDER NOTE - CLINICAL SUMMARY MEDICAL DECISION MAKING FREE TEXT BOX
Eliud: HA. Found to have dark lesion on R face and bumpy/bony lesion on scalp. Concern for cancer. CT, ESR/CRP. Reglan and Tylenol. Eliud: HA. Found to have dark lesion on R face and bumpy/bony lesion on scalp. Concern for cancer. CT, ESR/CRP. Reglan and Tylenol.    89-year-old female with past medical history of right eye blindness presenting with right-sided headache, increased lacrimation of the right eye, abdominal pain, and brown, asymmetric, mottled lesion on her right temporal area.  Patient symptoms have been progressively worsening over the past 3 weeks.  Cranial nerves intact equal strength bilaterally.  Lesions concerning for melanoma, concern for metastatic spread will obtain CT head, CT chest abdomen pelvis.  Will obtain basic labs to evaluate for electrolyte deficiencies or anemia.

## 2025-04-03 NOTE — ED PROVIDER NOTE - PROGRESS NOTE DETAILS
The patient is Watcher - Medium risk of patient condition declining or worsening    Shift Goals  Clinical Goals: Hemodynamic stability, q2 turns, tube feed  Patient Goals: Rest  Family Goals: Updates    Progress made toward(s) clinical / shift goals:    Problem: Pain - Standard  Goal: Alleviation of pain or a reduction in pain to the patient’s comfort goal  Outcome: Progressing     Problem: Hemodynamics  Goal: Patient's hemodynamics, fluid balance and neurologic status will be stable or improve  Outcome: Progressing     Problem: Respiratory  Goal: Patient will achieve/maintain optimum respiratory ventilation and gas exchange  Outcome: Progressing       Patient is not progressing towards the following goals:       Durga, PGY3: podiatry made aware of calcaneal fracture and evaluated patient. Placed in posterior splint by podiatry. CT ordered. Patient TBA to Dr. Suazo. Discussed w/ Dr. Suazo who accepts patient